# Patient Record
Sex: FEMALE | Race: WHITE | Employment: STUDENT | URBAN - METROPOLITAN AREA
[De-identification: names, ages, dates, MRNs, and addresses within clinical notes are randomized per-mention and may not be internally consistent; named-entity substitution may affect disease eponyms.]

---
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
## 2023-01-13 ENCOUNTER — PATIENT MESSAGE (OUTPATIENT)
Dept: FAMILY MEDICINE CLINIC | Facility: CLINIC | Age: 18
End: 2023-01-13

## 2023-01-16 RX ORDER — CYCLOBENZAPRINE HCL 5 MG
5 TABLET ORAL NIGHTLY PRN
Qty: 20 TABLET | Refills: 0 | Status: SHIPPED | OUTPATIENT
Start: 2023-01-16

## 2023-01-16 NOTE — TELEPHONE ENCOUNTER
From: Tristen Esparza  To: Steve Lu PA-C  Sent: 1/13/2023 8:09 PM CST  Subject: Need muscle relaxer for jaw pain    Hello,    I am still experiencing pain in my jaw. You mentioned muscle relaxers could help alleviate the pain. Is it possible to please prescribe.      Thanks,  Edis Palacio

## 2023-03-27 DIAGNOSIS — N94.6 DYSMENORRHEA: ICD-10-CM

## 2023-03-28 RX ORDER — LEVONORGESTREL AND ETHINYL ESTRADIOL 0.1-0.02MG
1 KIT ORAL DAILY
Qty: 3 EACH | Refills: 2 | Status: SHIPPED | OUTPATIENT
Start: 2023-03-28

## 2023-04-09 DIAGNOSIS — N94.6 DYSMENORRHEA: ICD-10-CM

## 2023-04-12 RX ORDER — LEVONORGESTREL AND ETHINYL ESTRADIOL 0.1-0.02MG
1 KIT ORAL DAILY
Qty: 3 EACH | Refills: 2 | Status: SHIPPED | OUTPATIENT
Start: 2023-04-12

## 2023-06-03 NOTE — LETTER
Date: 3/20/2019    Patient Name: Radha Britton DOB 2005        To Whom it may concern: The above patient was seen at the [facility] for treatment of a medical condition.     This patient should be excused from attending school late
Patient/Father

## 2023-06-29 DIAGNOSIS — N94.6 DYSMENORRHEA: ICD-10-CM

## 2023-06-29 RX ORDER — LEVONORGESTREL AND ETHINYL ESTRADIOL 0.1-0.02MG
1 KIT ORAL DAILY
Qty: 3 EACH | Refills: 2 | OUTPATIENT
Start: 2023-06-29

## 2023-06-30 ENCOUNTER — PATIENT MESSAGE (OUTPATIENT)
Dept: FAMILY MEDICINE CLINIC | Facility: CLINIC | Age: 18
End: 2023-06-30

## 2023-06-30 DIAGNOSIS — N94.6 DYSMENORRHEA: ICD-10-CM

## 2023-06-30 RX ORDER — LEVONORGESTREL AND ETHINYL ESTRADIOL 0.1-0.02MG
1 KIT ORAL DAILY
Qty: 3 EACH | Refills: 0 | Status: SHIPPED | OUTPATIENT
Start: 2023-06-30

## 2023-09-18 DIAGNOSIS — N94.6 DYSMENORRHEA: ICD-10-CM

## 2023-09-19 RX ORDER — LEVONORGESTREL AND ETHINYL ESTRADIOL 0.1-0.02MG
1 KIT ORAL DAILY
Qty: 1 EACH | Refills: 0 | Status: SHIPPED | OUTPATIENT
Start: 2023-09-19

## 2023-10-09 ENCOUNTER — OFFICE VISIT (OUTPATIENT)
Dept: FAMILY MEDICINE CLINIC | Facility: CLINIC | Age: 18
End: 2023-10-09
Payer: COMMERCIAL

## 2023-10-09 VITALS
SYSTOLIC BLOOD PRESSURE: 118 MMHG | TEMPERATURE: 98 F | BODY MASS INDEX: 30.05 KG/M2 | WEIGHT: 176 LBS | RESPIRATION RATE: 16 BRPM | OXYGEN SATURATION: 100 % | HEART RATE: 103 BPM | HEIGHT: 63.98 IN | DIASTOLIC BLOOD PRESSURE: 74 MMHG

## 2023-10-09 DIAGNOSIS — Z13.228 SCREENING FOR ENDOCRINE, NUTRITIONAL, METABOLIC AND IMMUNITY DISORDER: ICD-10-CM

## 2023-10-09 DIAGNOSIS — N94.6 DYSMENORRHEA: ICD-10-CM

## 2023-10-09 DIAGNOSIS — Z13.21 SCREENING FOR ENDOCRINE, NUTRITIONAL, METABOLIC AND IMMUNITY DISORDER: ICD-10-CM

## 2023-10-09 DIAGNOSIS — E66.9 OBESITY (BMI 30.0-34.9): ICD-10-CM

## 2023-10-09 DIAGNOSIS — Z13.29 SCREENING FOR ENDOCRINE, NUTRITIONAL, METABOLIC AND IMMUNITY DISORDER: ICD-10-CM

## 2023-10-09 DIAGNOSIS — Z11.3 ROUTINE SCREENING FOR STI (SEXUALLY TRANSMITTED INFECTION): ICD-10-CM

## 2023-10-09 DIAGNOSIS — Z13.0 SCREENING FOR ENDOCRINE, NUTRITIONAL, METABOLIC AND IMMUNITY DISORDER: ICD-10-CM

## 2023-10-09 DIAGNOSIS — Z79.899 MEDICATION MANAGEMENT: ICD-10-CM

## 2023-10-09 DIAGNOSIS — Z13.6 ENCOUNTER FOR LIPID SCREENING FOR CARDIOVASCULAR DISEASE: ICD-10-CM

## 2023-10-09 DIAGNOSIS — F41.0 EPISODIC PAROXYSMAL ANXIETY DISORDER: ICD-10-CM

## 2023-10-09 DIAGNOSIS — Z00.00 WELL ADULT EXAM: Primary | ICD-10-CM

## 2023-10-09 DIAGNOSIS — Z13.220 ENCOUNTER FOR LIPID SCREENING FOR CARDIOVASCULAR DISEASE: ICD-10-CM

## 2023-10-09 PROCEDURE — 99213 OFFICE O/P EST LOW 20 MIN: CPT | Performed by: FAMILY MEDICINE

## 2023-10-09 PROCEDURE — 3074F SYST BP LT 130 MM HG: CPT | Performed by: FAMILY MEDICINE

## 2023-10-09 PROCEDURE — 3078F DIAST BP <80 MM HG: CPT | Performed by: FAMILY MEDICINE

## 2023-10-09 PROCEDURE — 87491 CHLMYD TRACH DNA AMP PROBE: CPT | Performed by: FAMILY MEDICINE

## 2023-10-09 PROCEDURE — 87591 N.GONORRHOEAE DNA AMP PROB: CPT | Performed by: FAMILY MEDICINE

## 2023-10-09 PROCEDURE — 99395 PREV VISIT EST AGE 18-39: CPT | Performed by: FAMILY MEDICINE

## 2023-10-09 PROCEDURE — 3008F BODY MASS INDEX DOCD: CPT | Performed by: FAMILY MEDICINE

## 2023-10-09 RX ORDER — DESOGESTREL AND ETHINYL ESTRADIOL 0.15-0.03
1 KIT ORAL DAILY
Qty: 28 TABLET | Refills: 12 | Status: SHIPPED | OUTPATIENT
Start: 2023-10-09 | End: 2024-10-08

## 2023-10-09 RX ORDER — HYDROXYZINE HYDROCHLORIDE 10 MG/1
TABLET, FILM COATED ORAL 3 TIMES DAILY PRN
Qty: 30 TABLET | Refills: 0 | Status: SHIPPED | OUTPATIENT
Start: 2023-10-09

## 2023-10-10 LAB
C TRACH DNA SPEC QL NAA+PROBE: NEGATIVE
N GONORRHOEA DNA SPEC QL NAA+PROBE: NEGATIVE

## 2023-10-10 NOTE — PROGRESS NOTES
Note was reviewed and addended patient was seen by Nile Cash PA-C with  Ashok Mcmillan RN, APN student

## 2024-01-09 ENCOUNTER — PATIENT MESSAGE (OUTPATIENT)
Dept: FAMILY MEDICINE CLINIC | Facility: CLINIC | Age: 19
End: 2024-01-09

## 2024-01-10 ENCOUNTER — LAB REQUISITION (OUTPATIENT)
Dept: LAB | Facility: HOSPITAL | Age: 19
End: 2024-01-10
Payer: COMMERCIAL

## 2024-01-10 DIAGNOSIS — N94.6 DYSMENORRHEA: ICD-10-CM

## 2024-01-10 DIAGNOSIS — L30.9 DERMATITIS, UNSPECIFIED: ICD-10-CM

## 2024-01-10 PROCEDURE — 88305 TISSUE EXAM BY PATHOLOGIST: CPT

## 2024-01-10 RX ORDER — DESOGESTREL AND ETHINYL ESTRADIOL 0.15-0.03
1 KIT ORAL DAILY
Qty: 84 TABLET | Refills: 1 | Status: SHIPPED | OUTPATIENT
Start: 2024-01-10 | End: 2025-01-09

## 2024-01-10 NOTE — TELEPHONE ENCOUNTER
From: Michell Romero  To: Jennifer Crowell  Sent: 1/9/2024 1:35 PM CST  Subject: Birth Control Prescription Update    Hello!     I want to continue taking my current birth control prescription, however I was wondering if we can change the supply to three months instead of one for my upcoming refill.     Thanks!     Michell SOLIZ

## 2024-01-30 ENCOUNTER — TELEMEDICINE (OUTPATIENT)
Dept: FAMILY MEDICINE CLINIC | Facility: CLINIC | Age: 19
End: 2024-01-30
Payer: COMMERCIAL

## 2024-01-30 DIAGNOSIS — S93.422A SPRAIN OF DELTOID LIGAMENT OF LEFT ANKLE, INITIAL ENCOUNTER: Primary | ICD-10-CM

## 2024-01-30 DIAGNOSIS — M79.672 LEFT FOOT PAIN: ICD-10-CM

## 2024-01-30 PROCEDURE — 99213 OFFICE O/P EST LOW 20 MIN: CPT | Performed by: FAMILY MEDICINE

## 2024-01-30 RX ORDER — KETOCONAZOLE 20 MG/ML
SHAMPOO TOPICAL
COMMUNITY
Start: 2024-01-09

## 2024-01-30 RX ORDER — FLUOCINONIDE TOPICAL SOLUTION USP, 0.05% 0.5 MG/ML
SOLUTION TOPICAL
COMMUNITY
Start: 2024-01-09

## 2024-01-30 NOTE — PROGRESS NOTES
Michell Romero is a 18 year old female.  HPI:   Please note that the following visit was completed using two-way, real-time interactive audio and video communication.  This has been done in good denia to provide continuity of care in the best interest of the provider-patient relationship,  There are limitations of this visit as no physical exam could be performed.  Every conscious effort was taken to allow for sufficient and adequate time.  This billing was spent on reviewing labs, medications, radiology tests and decision making.  Appropriate medical decision-making and tests are ordered as detailed in the plan of care above.      Audiovideo call with patient to discuss ankle and foot pain.  Patient states it was missed no a day 2 weeks ago was walking her dog and slipped slightly and felt a strain to the inside of her left foot and ankle.  States that there was minimal swelling but no bruising.  No numbness, tingling or underlying weakness.  Did have a problem walking for the first couple of days when she first injured herself is now walking and working out though when she increases her activity gets more pain.  She is able to walk on her heels and toes with minimal discomfort.  Repetition of activity aggravates the inner aspect of the foot and ankle along the deltoid ligament.  She has not had any toe pain but does state that the foot pain will extend to the middle aspect of   medial part of the foot.  No prior injury and no reoccurrence of injury  Current Outpatient Medications   Medication Sig Dispense Refill    Fluocinonide 0.05 % External Solution Apply to affected area(s) on scalp every night at bedtime for 2 weeks when flared as needed      ketoconazole 2 % External Shampoo Massage into scalp 2 to 3 times weekly; let sit for 3 to 5 minutes, then rinse      Desogestrel-Ethinyl Estradiol (RECLIPSEN) 0.15-30 MG-MCG Oral Tab Take 1 tablet by mouth daily. 84 tablet 1    hydrOXYzine 10 MG Oral Tab Take 1-3  tablets (10-30 mg total) by mouth 3 (three) times daily as needed for Anxiety. 30 tablet 0    cyclobenzaprine 5 MG Oral Tab Take 1 tablet (5 mg total) by mouth nightly as needed for Muscle spasms (TMJ). 20 tablet 0    Multiple Vitamins-Minerals (MULTIVITAMIN GUMMIES WOMENS) Oral Chew Tab Chew 3 tablets by mouth daily.      aspirin-acetaminophen-caffeine 250-250-65 MG Oral Tab Take 1 tablet by mouth every 6 (six) hours as needed for Pain.      lactase 9000 units Oral Chew Tab Chew 1 tablet (9,000 Units total) by mouth 3 (three) times daily as needed.      ibuprofen (MOTRIN) 200 MG Oral Tab Take 2 tablets (400 mg total) by mouth every 6 (six) hours as needed for Pain (menstrual pain).      busPIRone 10 MG Oral Tab Take 1 tablet (10 mg total) by mouth every morning. (Patient not taking: Reported on 1/30/2024) 90 tablet 1      Past Medical History:   Diagnosis Date    Allergic rhinitis     Anxiety     Closed fracture of middle phalanx of ring finger 12/02/2014      Social History:  Social History     Socioeconomic History    Marital status: Single   Tobacco Use    Smoking status: Never    Smokeless tobacco: Never   Vaping Use    Vaping Use: Never used   Substance and Sexual Activity    Alcohol use: No    Drug use: No   Other Topics Concern     Service No    Blood Transfusions No    Caffeine Concern Yes     Comment: 2 coffees daily and  1 can of pop & 1 cup of tea weekly    Occupational Exposure No    Hobby Hazards No    Sleep Concern No    Stress Concern Yes    Weight Concern No    Special Diet No    Back Care No    Exercise Yes     Comment: 6 times weekly    Bike Helmet No    Seat Belt Yes    Self-Exams No        REVIEW OF SYSTEMS:   GENERAL HEALTH: feels well otherwise  SKIN: denies any unusual skin lesions or rashes  RESPIRATORY: denies shortness of breath with exertion  CARDIOVASCULAR: denies chest pain on exertion  GI: denies abdominal pain and denies heartburn  NEURO: denies headaches  Musculoskeletal:   see above  EXAM:   No vitals taken due to tele-visit.  20 minutes time was spent reviewing patient's inquiry, patient's record including prior labs, developing management plan and ordering tests and prescriptions.    Full exam was not done secondary to virtual visit.  Reviewed medications, problem list and allergies.  GENERAL: Patient is speaking in full sentences no increased work in breathing patient is alert and orientated x3.    Psych patient's mood is normal and communication skills are good  Patient was able to show the area of where her foot is bothering her which seems to be along the deltoid ligament of the left navicular bone with tenderness over that bone.  There is no swelling or bruising present.  ASSESSMENT AND PLAN:     Encounter Diagnoses   Name Primary?    Sprain of deltoid ligament of left ankle, initial encounter Yes    Left foot pain        No orders of the defined types were placed in this encounter.      Meds & Refills for this Visit:  Requested Prescriptions      No prescriptions requested or ordered in this encounter       Imaging & Consults:  XR ANKLE (MIN 3 VIEWS), LEFT (CPT=73610)  XR FOOT, COMPLETE (MIN 3 VIEWS), LEFT (CPT=73630)  1. Sprain of deltoid ligament of left ankle, initial encounter   Left foot pain  - XR Ankle left (Min 3 views) - [facility] Consult Only; Future  - XR FOOT, COMPLETE (MIN 3 VIEWS), LEFT (CPT=73630); Future  Patient is away at school but coming in on Friday will get x-rays done at that time discussed the possibility and avulsion fracture but most likely this is just a grade 2 ligament strain and will require home therapy.  Reviewed exercises that she can do and exercises for proprioception once the x-ray confirms no fracture.  She can do topical CBD, Voltaren, to the ankle rubbing it in to help with the healing of the sprain.  Discussed possibilities of reoccurring sprains with the area being vulnerable to weakness.  Important for her to wrap if she is doing  anything extensive over the next few weeks and to continue with the ankle exercises as instructed can go on YouTube for further ankle exercises.      The patient indicates understanding of these issues and agrees to the plan.  The patient is asked to return  as needed.    This note was created by [employer] voice recognition. Errors in content may be related to improper recognition by the system; efforts to review and correct have been done but errors may still exist.  Note to patient: The 21st Century Cures Act makes medical notes like these available to patients in the interest of transparency. However, be advised this is a medical document. It is intended as peer to peer communication. It is written in medical language and may contain abbreviations or verbiage that are unfamiliar. It may appear blunt or direct. Medical documents are intended to carry relevant information, facts as evident, and the clinical opinion of the practitioner.

## 2024-02-02 ENCOUNTER — HOSPITAL ENCOUNTER (OUTPATIENT)
Dept: GENERAL RADIOLOGY | Age: 19
Discharge: HOME | End: 2024-02-02
Attending: FAMILY MEDICINE
Payer: COMMERCIAL

## 2024-02-02 DIAGNOSIS — M79.672 LEFT FOOT PAIN: ICD-10-CM

## 2024-02-02 DIAGNOSIS — S93.422A SPRAIN OF DELTOID LIGAMENT OF LEFT ANKLE, INITIAL ENCOUNTER: ICD-10-CM

## 2024-02-02 DIAGNOSIS — N94.6 DYSMENORRHEA: ICD-10-CM

## 2024-02-02 PROCEDURE — 73630 X-RAY EXAM OF FOOT: CPT | Performed by: FAMILY MEDICINE

## 2024-02-02 PROCEDURE — 73610 X-RAY EXAM OF ANKLE: CPT | Performed by: FAMILY MEDICINE

## 2024-02-05 RX ORDER — DESOGESTREL AND ETHINYL ESTRADIOL 0.15-0.03
1 KIT ORAL DAILY
Qty: 84 TABLET | Refills: 1 | OUTPATIENT
Start: 2024-02-05 | End: 2025-02-04

## 2024-05-05 ENCOUNTER — PATIENT MESSAGE (OUTPATIENT)
Dept: FAMILY MEDICINE CLINIC | Facility: CLINIC | Age: 19
End: 2024-05-05

## 2024-05-05 DIAGNOSIS — S99.912D INJURY OF LEFT ANKLE, SUBSEQUENT ENCOUNTER: Primary | ICD-10-CM

## 2024-05-06 NOTE — TELEPHONE ENCOUNTER
Patient is following up regarding ankle injury from January. Still having pain. OK to refer to podiatry?

## 2024-05-06 NOTE — TELEPHONE ENCOUNTER
From: Michell Romero  To: Jennifer Crowell  Sent: 5/5/2024 10:22 PM CDT  Subject: Sprain of deltoid ligament still hurts     Hello,     I am just following up regarding my ligament injury that I got in mid-January. Since x-rays in february showing no fractures, I’ve been wearing an ankle brace, doing stretches and resting it as much as I can but I am still in pain the more I use/walk on my foot. I just wanted to see if there is anything else I should/can be doing and ask if a sprain of this nature is just something that takes this long to heal.     Any guidance would be appreciated.    Thanks,  Michell Romero

## 2024-05-24 ENCOUNTER — APPOINTMENT (OUTPATIENT)
Dept: GENERAL RADIOLOGY | Age: 19
End: 2024-05-24
Attending: PHYSICIAN ASSISTANT

## 2024-05-24 ENCOUNTER — HOSPITAL ENCOUNTER (OUTPATIENT)
Age: 19
Discharge: HOME | End: 2024-05-24

## 2024-05-24 ENCOUNTER — TELEPHONE (OUTPATIENT)
Dept: ORTHOPEDICS CLINIC | Facility: CLINIC | Age: 19
End: 2024-05-24

## 2024-05-24 VITALS
TEMPERATURE: 98 F | HEART RATE: 95 BPM | OXYGEN SATURATION: 99 % | WEIGHT: 170 LBS | BODY MASS INDEX: 29.02 KG/M2 | DIASTOLIC BLOOD PRESSURE: 74 MMHG | HEIGHT: 64 IN | SYSTOLIC BLOOD PRESSURE: 141 MMHG | RESPIRATION RATE: 16 BRPM

## 2024-05-24 DIAGNOSIS — S90.32XA CONTUSION OF LEFT FOOT, INITIAL ENCOUNTER: Primary | ICD-10-CM

## 2024-05-24 PROCEDURE — 99204 OFFICE O/P NEW MOD 45 MIN: CPT

## 2024-05-24 PROCEDURE — 73630 X-RAY EXAM OF FOOT: CPT | Performed by: PHYSICIAN ASSISTANT

## 2024-05-24 PROCEDURE — 99213 OFFICE O/P EST LOW 20 MIN: CPT

## 2024-05-24 RX ORDER — IBUPROFEN 600 MG/1
600 TABLET ORAL EVERY 8 HOURS PRN
Qty: 30 TABLET | Refills: 0 | Status: SHIPPED | OUTPATIENT
Start: 2024-05-24 | End: 2024-06-03

## 2024-05-24 RX ORDER — HYDROCODONE BITARTRATE AND ACETAMINOPHEN 5; 325 MG/1; MG/1
1 TABLET ORAL EVERY 12 HOURS PRN
Qty: 6 TABLET | Refills: 0 | Status: SHIPPED | OUTPATIENT
Start: 2024-05-24

## 2024-05-24 NOTE — TELEPHONE ENCOUNTER
October 13, 2022       Luz Marina Aviles DO  [address] [facility] [address]  Via In Basket      Patient: Nuha Greenberg   YOB: 1980   Date of Visit: 10/13/2022       Dear Dr. Aviles:    Thank you for referring Nuha Greenberg to me for evaluation. Below are my notes for this visit with her.    If you have questions, please do not hesitate to call me. I look forward to following your patient along with you.      Sincerely,        Dioni Zepeda MD        CC: MD Dioni Mina MD  10/13/2022 10:14 AM  Signed      Subjective   Patient ID: Nuha is a 42 year old female.  Referring Physician: Luz Marina Aviles DO    Chief Complaint   Patient presents with   • Consultation     Possible IBS - loose stools. needs colon prior to hysterectomy (scheduled for 11/29).     HPI    42 year old female with PMH significant for asthma, migraines, hx of cholecystectomy who presents with diarrhea.     I personally reviewed the record from patient's visit with Dr. Pedersen 9/11/22 which is summarized as follows:  She has been having diarrhea intermittently for the prior 10 years.  She is also having urgency with bowel movements.  Certain foods can make her urgency worse including burger/fries, greasy foods.  Bowel movements vary from loose to formed.  She does eat oatmeal several times a week.  She has tried Pepto-Bismol with some improvement.  She has also tried Tums with improvement as well.  She does take Zoloft and has been on this long-term.  She was told to keep a food journal and try low FODMAP diet.    I personally reviewed the most recent labs which are summarized as follows:  6/21/22 -normal CMP, normal CBC, normal TSH    Appt 10/13/22:  She had a cholecystectomy in 2012 and her diarrhea started after that.   She has a hysterectomy scheduled for 11/29/22 for menorrhagia/increased endometrial thickening.   She has lost 15-20 pounds recently due to having significant bowel frequency and not eating.  Patient is scheduled for left foot pain. Please advise if imaging is needed.     Future Appointments   Date Time Provider Department Center   6/4/2024  9:45 AM Julianna Vidales DPM [facility]   6/17/2024  9:30 AM Mathieu Brand DPM [id number]          Previously she was having up to 2 bowel movements per day which were \"mushy\" without blood or mucus but there was significant urgency.   She is now having a bowel movement every couple days but she hasn't been eating much.  She doesn't chew gum or eat mints on a regular basis.      Past Medical History:   Diagnosis Date   • Abdominal pain     LLQ   • Abnormal uterine bleeding    • Asthma    • Galactorrhea    • Hypothyroid    • Menorrhagia    • Migraines      Past Surgical History:   Procedure Laterality Date   • Cholecystectomy     • Hysteroscopy      Uterine polyp   • Tonsillectomy and adenoidectomy       Current Outpatient Medications   Medication Sig Dispense Refill   • cholestyramine (QUESTRAN) 4 g packet Take 1 packet by mouth in the morning and 1 packet in the evening. Take with meals. Space out by at least 2 hours from other medications 30 packet 5   • levothyroxine 150 MCG tablet Take 1 tablet by mouth daily. 90 tablet 3   • sertraline (ZOLOFT) 100 MG tablet TAKE 1 TABLET DAILY 90 tablet 0   • sumatriptan (IMITREX) 100 MG tablet Take 1 tablet by mouth at onset of migraine. May repeat after 2 hours if needed. 27 tablet 3   • budesonide-formoterol (SYMBICORT) 160-4.5 MCG/ACT inhaler Inhale 2 puffs into the lungs 2 times daily. (Patient taking differently: Inhale 2 puffs into the lungs 2 times daily as needed.) 10.2 g 6   • albuterol 108 (90 Base) MCG/ACT inhaler Inhale 2 puffs into the lungs every 4 hours as needed for Shortness of Breath or Wheezing.       No current facility-administered medications for this visit.       ALLERGIES:  No Known Allergies    Family History   Problem Relation Age of Onset   • Hyperlipidemia Mother    • Myocardial Infarction Father    • Coronary Artery Disease Father    • Cancer, Colon Neg Hx    • Cancer, Esophageal Neg Hx    • Cancer, Liver Neg Hx    • Cancer, Rectal Neg Hx    • Cancer, Stomach Neg Hx        Social History     Tobacco Use   • Smoking status: Never Smoker   •  Smokeless tobacco: Never Used   Vaping Use   • Vaping Use: never used   Substance Use Topics   • Alcohol use: Yes     Comment: occ   • Drug use: Never           ROS  Except as noted elsewhere in the note, the ROS is negative for Constitutional, HEENT, CV, Respiratory, GI, , Musculoskeletal, Neuro, Psychiatric, Heme/Lymph note, Allergy,Endocrine, and Skin.       Objective   /74   Pulse 72   Ht 5' 1\" (1.549 m)   Wt 84.8 kg (187 lb)   LMP 08/28/2022 (Exact Date)   BMI 35.33 kg/m²   BSA 1.84 m²   The patient is alert and oriented, in no acute distress.  HEENT: Sclera are nonicteric.  CARDIAC: RRR, normal S1, S2. No murmurs, rubs or gallops.  PULMONARY: Clear bilaterally to ascultation  ABDOMEN: Soft, non-tender, non-distended. Bowel sounds arepresent. No obvious hepatomegaly, splenomegaly, or masses. No hernias or ascites.  PERIANAL: Examination deferred.  GENITALS: Examination deferred.  LYMPATICS: No palpable nodes in the neck  MUSCULOSKELETAL: Normal station, digits and nails. Extremities are all normal without edema.  SKIN: Anicteric.      Assessment   Problem List Items Addressed This Visit        Gastrointestinal and Abdominal    Bile salt-induced diarrhea - Primary        She most likely has bile acid induced diarrhea since her symptoms started after her cholecystectomy about 10 years ago.  She does not have a lot of bowel frequency but she does have significant urgency after eating fatty foods.  I am recommending cholestyramine 4 g daily before dinner and if this does not work she can increase the dose to 4 g twice daily before breakfast and dinner.  She was told to space this medicine out at least 2 hours from any other medication she is taking.  Microscopic colitis is also on the differential given that she takes an SSRI but the likelihood of this is low.  She will follow-up with me in 2 months and if she is still having diarrhea despite using cholestyramine I will plan to do an upper endoscopy  and colonoscopy.  She will need a screening colonoscopy starting at age 43 so this can be planned sometime next year after May 2023.    Dioni Zepeda MD  Gastroenterology  Specialist in Inflammatory Bowel Disease (Crohn's and Colitis)

## 2024-05-24 NOTE — ED PROVIDER NOTES
Patient Seen in: [facility]      History     Chief Complaint   Patient presents with    Foot Injury     Stated Complaint: foot injury    Subjective:   HPI  Michell Romero is 19 year old female presents with acute left foot pain x 1 day due to inversion injury.  Patient reports pain localized to dorsum.  Non radiating, worsened w/ ambulation, weight bearing, and palpation.  No numbness, tingling, parasthesias, skin/ color/ temperature/ sensory changes reported.  No medications taken prior to arrival.           Objective:   Past Medical History:    Allergic rhinitis    Anxiety    Closed fracture of middle phalanx of ring finger              History reviewed. No pertinent surgical history.             No pertinent social history.            Review of Systems   All other systems reviewed and are negative.      Positive for stated complaint: foot injury  Other systems are as noted in HPI.  Constitutional and vital signs reviewed.      All other systems reviewed and negative except as noted above.    Physical Exam     ED Triage Vitals [05/24/24 1014]   /74   Pulse 95   Resp 16   Temp 97.8 °F (36.6 °C)   Temp src Temporal   SpO2 99 %   O2 Device        Current Vitals:   Vital Signs  BP: 141/74  Pulse: 95  Resp: 16  Temp: 97.8 °F (36.6 °C)  Temp src: Temporal    Oxygen Therapy  SpO2: 99 %            Physical Exam  Vitals and nursing note reviewed.   Constitutional:       General: She is not in acute distress.     Appearance: Normal appearance. She is normal weight. She is not ill-appearing, toxic-appearing or diaphoretic.   HENT:      Head: Normocephalic and atraumatic.      Right Ear: External ear normal.      Left Ear: External ear normal.      Nose: Nose normal.   Eyes:      Extraocular Movements: Extraocular movements intact.      Conjunctiva/sclera: Conjunctivae normal.      Pupils: Pupils are equal, round, and reactive to light.   Pulmonary:      Effort: Pulmonary effort is normal. No  respiratory distress.   Musculoskeletal:         General: Tenderness and signs of injury present. No swelling or deformity. Normal range of motion.      Cervical back: Normal range of motion and neck supple.      Comments: Superficial abrasion over dorsum of left foot otherwise capillary refill less than 2 seconds, DP/ PT pulses intact 2+ bilaterally      Skin:     General: Skin is warm and dry.      Capillary Refill: Capillary refill takes less than 2 seconds.      Coloration: Skin is not jaundiced or pale.      Findings: No bruising, erythema, lesion or rash.   Neurological:      General: No focal deficit present.      Mental Status: She is alert and oriented to person, place, and time. Mental status is at baseline.      Gait: Gait normal.   Psychiatric:         Mood and Affect: Mood normal.         Behavior: Behavior normal.               ED Course   Labs Reviewed - No data to display  XR FOOT, COMPLETE (MIN 3 VIEWS), LEFT (CPT=73630)   Final Result   PROCEDURE:  XR FOOT, COMPLETE (MIN 3 VIEWS), LEFT (CPT=73630)       TECHNIQUE:  AP, oblique, and lateral views were obtained.       COMPARISON:  IRIS CORDOVA, XR FOOT, COMPLETE (MIN 3 VIEWS), LEFT    (CPT=73630), 2/02/2024, 2:49 PM.       INDICATIONS:  Status post injury to the dorsum of the left foot, pain.       PATIENT STATED HISTORY: (As transcribed by Technologist)  Left foot,    dorsal surface pain after falling.            FINDINGS:     BONES:  Normal.  No significant arthropathy or acute abnormality.   SOFT TISSUES:  Negative.  No visible soft tissue swelling.   EFFUSION:  None visible.   OTHER:  Negative.                         =====   CONCLUSION:  Negative exam.           LOCATION:  [facility]           Dictated by (CST): Cecilia Montoya DO on 5/24/2024 at 10:57 AM        Finalized by (CST): Cecilia Montoya DO on 5/24/2024 at 10:58 AM                            [facility]                                        Medical Decision Making  19 year old female presents with  contusion to dorsum of left foot sustained less than 24 hours prior.  Consider fracture vs dislocation vs contusion   Plan  - x ray: left foot  -  Crutches  - OTC: Tylenol 650mg po q 6 hours/ prn.   - rx: ibuprofen 600mg po q8 hours/ prn. norco 5/325mg po prn breakthrough pain sparingly, if needed   - RICE Apply a compressive ACE bandage. Rest and elevate the affected painful area. Apply cold compresses intermittently as needed. As pain recedes, begin normal activities slowly as tolerated. Call if symptoms persist.  - explained to patient that if symptoms do not improve that an MRI may be warranted however that will be determined at the discretion of follow up care  - refer to orthopaedics/ PCP  as needed     Amount and/or Complexity of Data Reviewed  Radiology: ordered and independent interpretation performed. Decision-making details documented in ED Course.     Details: No fracture or dislocation based on my independent interpretation         Disposition and Plan     Clinical Impression:  1. Contusion of left foot, initial encounter         Disposition:  Discharge  5/24/2024 11:08 am    Follow-up:  Julianna Vidales, DPM  [address] [place] [address]  [phone]                Medications Prescribed:  Current Discharge Medication List        START taking these medications    Details   !! ibuprofen 600 MG Oral Tab Take 1 tablet (600 mg total) by mouth every 8 (eight) hours as needed.  Qty: 30 tablet, Refills: 0      HYDROcodone-acetaminophen 5-325 MG Oral Tab Take 1 tablet by mouth every 12 (twelve) hours as needed for Pain.  Qty: 6 tablet, Refills: 0    Associated Diagnoses: Contusion of left foot, initial encounter       !! - Potential duplicate medications found. Please discuss with provider.

## 2024-06-04 ENCOUNTER — OFFICE VISIT (OUTPATIENT)
Dept: ORTHOPEDICS CLINIC | Facility: CLINIC | Age: 19
End: 2024-06-04
Payer: COMMERCIAL

## 2024-06-04 ENCOUNTER — HOSPITAL ENCOUNTER (OUTPATIENT)
Dept: GENERAL RADIOLOGY | Age: 19
Discharge: HOME | End: 2024-06-04
Attending: PODIATRIST
Payer: COMMERCIAL

## 2024-06-04 VITALS — BODY MASS INDEX: 29.02 KG/M2 | HEIGHT: 64 IN | WEIGHT: 170 LBS

## 2024-06-04 DIAGNOSIS — M79.672 PAIN ASSOCIATED WITH ACCESSORY NAVICULAR BONE OF LEFT FOOT: ICD-10-CM

## 2024-06-04 DIAGNOSIS — G89.29 CHRONIC PAIN OF LEFT ANKLE: ICD-10-CM

## 2024-06-04 DIAGNOSIS — Q74.2 PAIN ASSOCIATED WITH ACCESSORY NAVICULAR BONE OF LEFT FOOT: ICD-10-CM

## 2024-06-04 DIAGNOSIS — M77.9 TENDONITIS: Primary | ICD-10-CM

## 2024-06-04 DIAGNOSIS — M79.672 LEFT FOOT PAIN: ICD-10-CM

## 2024-06-04 DIAGNOSIS — M25.572 CHRONIC PAIN OF LEFT ANKLE: ICD-10-CM

## 2024-06-04 PROCEDURE — 99204 OFFICE O/P NEW MOD 45 MIN: CPT | Performed by: PODIATRIST

## 2024-06-04 PROCEDURE — 73630 X-RAY EXAM OF FOOT: CPT | Performed by: PODIATRIST

## 2024-06-04 PROCEDURE — 3008F BODY MASS INDEX DOCD: CPT | Performed by: PODIATRIST

## 2024-06-04 RX ORDER — MELOXICAM 15 MG/1
15 TABLET ORAL DAILY
Qty: 15 TABLET | Refills: 1 | Status: SHIPPED | OUTPATIENT
Start: 2024-06-04

## 2024-06-04 RX ORDER — CLOBETASOL PROPIONATE 0.46 MG/ML
SOLUTION TOPICAL
COMMUNITY
Start: 2024-03-28

## 2024-06-04 NOTE — PROGRESS NOTES
[facility] New Patient Note    CC:   Chief Complaint   Patient presents with    Leg or Foot Injury     Left foot injury;   DOI: 05/24/24;  *fell down stairs and foot went under her as she landed on it  Previous injury 01/15/24  *tripped and foot went inward;  Pain Score: 5       HPI: The patient is a 19 year old female who presents today with complaints of pain and swelling on the inside of her left foot.    January she had an injury  Sprained deltoid ligament ankle    2 weeks ago, tripped ontop of foot going down stairs  Was in flip flops   Saw  and they suggested she follow-up in case the 2 events were related and for further care      Past Medical History:    Allergic rhinitis    Anxiety    Closed fracture of middle phalanx of ring finger     History reviewed. No pertinent surgical history.  Current Outpatient Medications   Medication Sig Dispense Refill    clobetasol 0.05 % External Solution       Meloxicam 15 MG Oral Tab Take 1 tablet (15 mg total) by mouth daily. 15 tablet 1    HYDROcodone-acetaminophen 5-325 MG Oral Tab Take 1 tablet by mouth every 12 (twelve) hours as needed for Pain. 6 tablet 0    Fluocinonide 0.05 % External Solution Apply to affected area(s) on scalp every night at bedtime for 2 weeks when flared as needed      ketoconazole 2 % External Shampoo Massage into scalp 2 to 3 times weekly; let sit for 3 to 5 minutes, then rinse      Desogestrel-Ethinyl Estradiol (RECLIPSEN) 0.15-30 MG-MCG Oral Tab Take 1 tablet by mouth daily. 84 tablet 1    hydrOXYzine 10 MG Oral Tab Take 1-3 tablets (10-30 mg total) by mouth 3 (three) times daily as needed for Anxiety. 30 tablet 0    Multiple Vitamins-Minerals (MULTIVITAMIN GUMMIES WOMENS) Oral Chew Tab Chew 3 tablets by mouth daily.      aspirin-acetaminophen-caffeine 250-250-65 MG Oral Tab Take 1 tablet by mouth every 6 (six) hours as needed for Pain.      lactase 9000 units Oral Chew Tab Chew 1 tablet (9,000 Units total) by mouth 3 (three)  times daily as needed.      cyclobenzaprine 5 MG Oral Tab Take 1 tablet (5 mg total) by mouth nightly as needed for Muscle spasms (TMJ). (Patient not taking: Reported on 5/24/2024) 20 tablet 0    busPIRone 10 MG Oral Tab Take 1 tablet (10 mg total) by mouth every morning. (Patient not taking: Reported on 1/30/2024) 90 tablet 1    ibuprofen (MOTRIN) 200 MG Oral Tab Take 2 tablets (400 mg total) by mouth every 6 (six) hours as needed for Pain (menstrual pain). (Patient not taking: Reported on 6/4/2024)       Allergies   Allergen Reactions    Pcn [Penicillins] RASH    Doxycycline NAUSEA ONLY     Family History   Problem Relation Age of Onset    Psoriasis Mother     Hypertension Father     Other (Primary biliary cirrhosis) Maternal Grandmother     Dementia Paternal Grandmother     Heart Disease Paternal Grandfather     Cancer Neg     Stroke Neg      Social History     Occupational History    Not on file   Tobacco Use    Smoking status: Never    Smokeless tobacco: Never   Vaping Use    Vaping status: Never Used   Substance and Sexual Activity    Alcohol use: No    Drug use: No    Sexual activity: Not on file        ROS:  Complete ROS reviewed by me and non-contributory to the chief complaint except as mentioned above.    Physical Exam:    Ht 5' 4\" (1.626 m)   Wt 170 lb (77.1 kg)   LMP 05/03/2024 (Approximate)   BMI 29.18 kg/m²       Exam left foot and ankle.  Patient has a flexible flatfoot and overpronation in walking gait.  Left foot tenderness at the navicular tuberosity without dorsal navicular pain.  Pain at the talonavicular joint.  She has full strength of the posterior tibial tendon and she is able to go up onto her toes slowly but has a little discomfort but no weakness.  Tenderness along the course of the posterior tibial tendon but more so insertional tendinitis pain.  Sensation is intact sharp versus dull.  She can feel light touch to the tips of the toes  Palpable pedal pulses.  Hair growth is present.   Skin is supple and feet are well perfused and warm.    Strength is 5 out of 5 all muscle groups    Full range of motion and nonpainful motion of the ankle joint, subtalar joint, MP joints, and midfoot joints.     Imaging: X-ray of the foot today 3 views shows accessory navicular ossicle best seen on the lateral view.  Review of past x-rays ankle and foot show the ankle mortise to be clear and show again the accessory ossicle off the navicular.  Personally viewed, independently interpreted and radiology report read.      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        Plan:  I reviewed imaging and exam findings with the patient.  We discussed the findings on x-ray and the insertional tendinitis.  How this is related to the accessory ossicle and also with a flatfoot issue.  Likely she was having some instability from her injury and January and was aware of the foot not 100%, she took a fall again and now is reaggravated the PT tendon.    Recommending physical therapy for rehab and strengthening.  LE eval and treat  Modalities, graston and dry needling if available  Gait eval     RICE  Rx meloxicam  Take with food in AM  Dc if any side effects discussed    Shoes and types of shoes for her foot.  OTC inserts for arch support and consider custom at some point as well.    If pain persists or worsens, let us know  Next step is MRI of ankle to highlight PT tendon and navicular tuberosity    Julianna Vidales, DPMPodiatric Surgery  [facility]  [address], [address], [place] [address]   [address] [place] [address]   [address] [place] [address]  [email or web address]   [email or web address]  t: [phone]   f: [phone]              This document was partially prepared using Dragon Medical voice recognition software.

## 2024-06-22 DIAGNOSIS — N94.6 DYSMENORRHEA: ICD-10-CM

## 2024-06-26 RX ORDER — KETOCONAZOLE 20 MG/ML
SHAMPOO TOPICAL
Refills: 0 | OUTPATIENT
Start: 2024-06-26

## 2024-06-26 RX ORDER — DESOGESTREL AND ETHINYL ESTRADIOL 0.15-0.03
1 KIT ORAL DAILY
Qty: 84 TABLET | Refills: 0 | Status: SHIPPED | OUTPATIENT
Start: 2024-06-26

## 2024-06-26 RX ORDER — DESOGESTREL AND ETHINYL ESTRADIOL 0.15-0.03
1 KIT ORAL DAILY
Qty: 84 TABLET | Refills: 1 | OUTPATIENT
Start: 2024-06-26 | End: 2025-06-26

## 2024-07-02 ENCOUNTER — APPOINTMENT (OUTPATIENT)
Dept: GENERAL RADIOLOGY | Age: 19
End: 2024-07-02
Payer: COMMERCIAL

## 2024-07-02 ENCOUNTER — HOSPITAL ENCOUNTER (EMERGENCY)
Age: 19
Discharge: HOME | End: 2024-07-02
Payer: COMMERCIAL

## 2024-07-02 VITALS
SYSTOLIC BLOOD PRESSURE: 143 MMHG | WEIGHT: 170 LBS | OXYGEN SATURATION: 99 % | RESPIRATION RATE: 20 BRPM | DIASTOLIC BLOOD PRESSURE: 84 MMHG | TEMPERATURE: 98 F | BODY MASS INDEX: 29 KG/M2 | HEART RATE: 99 BPM

## 2024-07-02 DIAGNOSIS — S63.657A SPRAIN OF METACARPOPHALANGEAL (MCP) JOINT OF LEFT LITTLE FINGER, INITIAL ENCOUNTER: Primary | ICD-10-CM

## 2024-07-02 PROCEDURE — 29130 APPL FINGER SPLINT STATIC: CPT

## 2024-07-02 PROCEDURE — 73140 X-RAY EXAM OF FINGER(S): CPT

## 2024-07-02 PROCEDURE — 99283 EMERGENCY DEPT VISIT LOW MDM: CPT

## 2024-07-03 NOTE — ED INITIAL ASSESSMENT (HPI)
Patient arrives from with c/o injury to left pinky finger injury. States jammed while adjusting back pack

## 2024-07-03 NOTE — ED PROVIDER NOTES
Patient Seen in: [facility] [place]      History     Chief Complaint   Patient presents with    Arm or Hand Injury     Stated Complaint: finger injury    Subjective:   HPI    19-year-old female.  Patient was transferring her backpack from her right hand to her left hand.  Unfortunately, the hand loop caught her left pinky finger and vividly yanked it laterally.  She had pain to the MCP joint.  She does maintain full range of motion.    Objective:   Past Medical History:    Allergic rhinitis    Anxiety    Closed fracture of middle phalanx of ring finger              History reviewed. No pertinent surgical history.             Social History     Socioeconomic History    Marital status: Single   Tobacco Use    Smoking status: Never    Smokeless tobacco: Never   Vaping Use    Vaping status: Never Used   Substance and Sexual Activity    Alcohol use: No    Drug use: No   Other Topics Concern     Service No    Blood Transfusions No    Caffeine Concern Yes     Comment: 2 coffees daily and  1 can of pop & 1 cup of tea weekly    Occupational Exposure No    Hobby Hazards No    Sleep Concern No    Stress Concern Yes    Weight Concern No    Special Diet No    Back Care No    Exercise Yes     Comment: 6 times weekly    Bike Helmet No    Seat Belt Yes    Self-Exams No              Review of Systems    Positive for stated Chief Complaint: Arm or Hand Injury    Other systems are as noted in HPI.  Constitutional and vital signs reviewed.      All other systems reviewed and negative except as noted above.    Physical Exam     ED Triage Vitals [07/02/24 2216]   /84   Pulse 99   Resp 20   Temp 98 °F (36.7 °C)   Temp src Temporal   SpO2 99 %   O2 Device None (Room air)       Current Vitals:   Vital Signs  BP: 143/84  Pulse: 99  Resp: 20  Temp: 98 °F (36.7 °C)  Temp src: Temporal    Oxygen Therapy  SpO2: 99 %  O2 Device: None (Room air)            Physical Exam    Gen: Well appearing, well groomed,  alert and aware x 3  Neck: Supple, full range of motion  Eye examination: EOMs are intact, normal conjunctival  ENT: Atraumatic  Lung: No distress, RR, no retraction  Extremities:   No obvious swelling or deformity.  Maintains full flexion and extension to the finger.  Low-grade pain to palpation to the MCP joint    ED Course   Labs Reviewed - No data to display          XR FINGER(S) (MIN 2 VIEWS), LEFT 5TH (CPT=73140)    Result Date: 7/2/2024  PROCEDURE:  XR FINGER(S) (MIN 2 VIEWS), LEFT 5TH (CPT=73140)  INDICATIONS:  finger injury  COMPARISON:  None.  TECHNIQUE:  Three views of the finger were obtained.  PATIENT STATED HISTORY: (As transcribed by Technologist)  Patient got her pinky finger stuck in her backpack today. Patient stated at first she could not move her finger and now she has slight range of motion with pain.    FINDINGS:  There is no fracture, dislocation, or subluxation.  There is no lytic or blastic lesions.  The soft tissues are unremarkable.  The alignment of the bones is within normal limits.             CONCLUSION:  No acute disease.    LOCATION:  [facility]   Dictated by (CST): Rajesh Alejandre MD on 7/02/2024 at 10:41 PM     Finalized by (CST): Rajesh Alejandre MD on 7/02/2024 at 10:42 PM                MDM          No obvious swelling or deformity.  Maintains full flexion and extension to the finger.  Low-grade pain to palpation to the MCP joint    CONCLUSION:  No acute disease.    LOCATION:  [facility]   Dictated by (CST): Rajesh Alejandre MD on 7/02/2024 at 10:41 PM     Finalized by (CST): Rajesh Alejandre MD on 7/02/2024 at 10:42 PM       Long finger splint applied.  Remove and range of motion exercises at night.  Ice and Profen.                           Medical Decision Making      Disposition and Plan     Clinical Impression:  1. Sprain of metacarpophalangeal (MCP) joint of left little finger, initial encounter         Disposition:  There is no disposition on file for this visit.  There is no disposition  time on file for this visit.    Follow-up:  Natalie Hughes DO  [address]  [facility] [address] [phone]    Follow up            Medications Prescribed:  Current Discharge Medication List

## 2024-07-08 ENCOUNTER — PATIENT OUTREACH (OUTPATIENT)
Dept: CASE MANAGEMENT | Age: 19
End: 2024-07-08

## 2024-07-10 NOTE — PROGRESS NOTES
Pt had recent ED visit, calling to offer PCP follow-up apt (discharged 07/02)     Dr Natalie Hughes  Family Medicine, IP Consult to Primary Care  [address]  [facility] [address] [phone]  Multiple attempts w/no calls back; no apt made  Closing encounter  
Pt had recent ED visit, calling to offer PCP follow-up apt (discharged 07/02)    Dr Natalie Hughes  [facility] Medicine, IP Consult to Primary Care  [address]  [facility] [address] [phone]  LVM to call [phone] to assist w/scheduling apt      
stated

## 2024-08-16 ENCOUNTER — NURSE ONLY (OUTPATIENT)
Dept: FAMILY MEDICINE CLINIC | Facility: CLINIC | Age: 19
End: 2024-08-16
Payer: COMMERCIAL

## 2024-08-16 PROCEDURE — 90471 IMMUNIZATION ADMIN: CPT | Performed by: FAMILY MEDICINE

## 2024-08-16 PROCEDURE — 90715 TDAP VACCINE 7 YRS/> IM: CPT | Performed by: FAMILY MEDICINE

## 2024-09-16 ENCOUNTER — PATIENT MESSAGE (OUTPATIENT)
Dept: FAMILY MEDICINE CLINIC | Facility: CLINIC | Age: 19
End: 2024-09-16

## 2024-09-16 DIAGNOSIS — N94.6 DYSMENORRHEA: ICD-10-CM

## 2024-09-16 RX ORDER — DESOGESTREL AND ETHINYL ESTRADIOL 0.15-0.03
1 KIT ORAL DAILY
Qty: 84 TABLET | Refills: 0 | Status: SHIPPED | OUTPATIENT
Start: 2024-09-16

## 2024-09-16 NOTE — TELEPHONE ENCOUNTER
From: Michell Romero  To: Jennifer Crowell  Sent: 9/16/2024 10:03 AM CDT  Subject: Enskyce (birth control) refill    Hello,    I recently requested a refill of my Enskyce birth control prescription (84 day supply) through my [facility] pharmacy in [place]. I noticed that in my chart it still lists this medication to go to the [facility] pharmacy in [place]. I just want to ensure that I will get this prescription and that it will go to the [facility] pharmacy on [address] [place] [address].    Thanks,  Michell

## 2024-09-16 NOTE — TELEPHONE ENCOUNTER
Requested Prescriptions     Pending Prescriptions Disp Refills    ENSKYCE 0.15-30 MG-MCG Oral Tab [Pharmacy Med Name: Enskyce 0.15-30 Mg-Mcg Tab Lupi] 84 tablet 0     Sig: Take 1 tablet by mouth daily.        Last refill: 6/26/24 84 tabs 0 refills    Last Appointment: 1/30/24 Telemed    Next Appointment: No future OV's scheduled

## 2024-10-16 ENCOUNTER — TELEPHONE (OUTPATIENT)
Dept: FAMILY MEDICINE CLINIC | Facility: CLINIC | Age: 19
End: 2024-10-16

## 2024-10-16 ENCOUNTER — OFFICE VISIT (OUTPATIENT)
Dept: FAMILY MEDICINE CLINIC | Facility: CLINIC | Age: 19
End: 2024-10-16
Payer: COMMERCIAL

## 2024-10-16 VITALS
OXYGEN SATURATION: 99 % | WEIGHT: 170 LBS | SYSTOLIC BLOOD PRESSURE: 140 MMHG | DIASTOLIC BLOOD PRESSURE: 80 MMHG | RESPIRATION RATE: 18 BRPM | HEART RATE: 107 BPM | HEIGHT: 65 IN | TEMPERATURE: 98 F | BODY MASS INDEX: 28.32 KG/M2

## 2024-10-16 DIAGNOSIS — J02.9 SORE THROAT: ICD-10-CM

## 2024-10-16 DIAGNOSIS — J03.90 TONSILLITIS WITH EXUDATE: Primary | ICD-10-CM

## 2024-10-16 LAB
CONTROL LINE PRESENT WITH A CLEAR BACKGROUND (YES/NO): YES YES/NO
KIT LOT #: NORMAL NUMERIC

## 2024-10-16 PROCEDURE — 3077F SYST BP >= 140 MM HG: CPT | Performed by: NURSE PRACTITIONER

## 2024-10-16 PROCEDURE — 87081 CULTURE SCREEN ONLY: CPT | Performed by: NURSE PRACTITIONER

## 2024-10-16 PROCEDURE — 87880 STREP A ASSAY W/OPTIC: CPT | Performed by: NURSE PRACTITIONER

## 2024-10-16 PROCEDURE — 99203 OFFICE O/P NEW LOW 30 MIN: CPT | Performed by: NURSE PRACTITIONER

## 2024-10-16 PROCEDURE — 3008F BODY MASS INDEX DOCD: CPT | Performed by: NURSE PRACTITIONER

## 2024-10-16 PROCEDURE — 3079F DIAST BP 80-89 MM HG: CPT | Performed by: NURSE PRACTITIONER

## 2024-10-16 NOTE — PROGRESS NOTES
CHIEF COMPLAINT:     Chief Complaint   Patient presents with    Sore Throat     X 2 days, fever, tmax 101.6, ear pain, white patches, OTC ibuprofen and theraflu          HPI:   Michell Romero is a 19 year old female presents to clinic with complaint of sore throat. Patient has had for 2 days. Patient reports following associated symptoms: nasal congestion, cough, fever yesterday 101.6F, ear pain. Fever resolved today. White patched on tonsils. Has history of strep throat. No one is sick at home.  no known strep or mono exposure.   Treating symptoms with ibuprofen and theraflu.    Current Outpatient Medications   Medication Sig Dispense Refill    ENSKYCE 0.15-30 MG-MCG Oral Tab Take 1 tablet by mouth daily. 84 tablet 0    clobetasol 0.05 % External Solution  (Patient not taking: Reported on 7/2/2024)      Meloxicam 15 MG Oral Tab Take 1 tablet (15 mg total) by mouth daily. (Patient not taking: Reported on 7/2/2024) 15 tablet 1    HYDROcodone-acetaminophen 5-325 MG Oral Tab Take 1 tablet by mouth every 12 (twelve) hours as needed for Pain. (Patient not taking: Reported on 7/2/2024) 6 tablet 0    Fluocinonide 0.05 % External Solution Apply to affected area(s) on scalp every night at bedtime for 2 weeks when flared as needed (Patient not taking: Reported on 7/2/2024)      ketoconazole 2 % External Shampoo Massage into scalp 2 to 3 times weekly; let sit for 3 to 5 minutes, then rinse (Patient not taking: Reported on 7/2/2024)      hydrOXYzine 10 MG Oral Tab Take 1-3 tablets (10-30 mg total) by mouth 3 (three) times daily as needed for Anxiety. 30 tablet 0    cyclobenzaprine 5 MG Oral Tab Take 1 tablet (5 mg total) by mouth nightly as needed for Muscle spasms (TMJ). (Patient not taking: Reported on 5/24/2024) 20 tablet 0    busPIRone 10 MG Oral Tab Take 1 tablet (10 mg total) by mouth every morning. (Patient not taking: Reported on 1/30/2024) 90 tablet 1    Multiple Vitamins-Minerals (MULTIVITAMIN GUMMIES WOMENS) Oral  Chew Tab Chew 3 tablets by mouth daily.      aspirin-acetaminophen-caffeine 250-250-65 MG Oral Tab Take 1 tablet by mouth every 6 (six) hours as needed for Pain. (Patient not taking: Reported on 7/2/2024)      lactase 9000 units Oral Chew Tab Chew 1 tablet (9,000 Units total) by mouth 3 (three) times daily as needed.      ibuprofen (MOTRIN) 200 MG Oral Tab Take 2 tablets (400 mg total) by mouth every 6 (six) hours as needed for Pain (menstrual pain). (Patient not taking: Reported on 6/4/2024)        Past Medical History:    Allergic rhinitis    Anxiety    Closed fracture of middle phalanx of ring finger      Social History:  Social History     Socioeconomic History    Marital status: Single   Tobacco Use    Smoking status: Never    Smokeless tobacco: Never   Vaping Use    Vaping status: Never Used   Substance and Sexual Activity    Alcohol use: No    Drug use: No   Other Topics Concern     Service No    Blood Transfusions No    Caffeine Concern Yes     Comment: 2 coffees daily and  1 can of pop & 1 cup of tea weekly    Occupational Exposure No    Hobby Hazards No    Sleep Concern No    Stress Concern Yes    Weight Concern No    Special Diet No    Back Care No    Exercise Yes     Comment: 6 times weekly    Bike Helmet No    Seat Belt Yes    Self-Exams No        REVIEW OF SYSTEMS:   GENERAL HEALTH: feels well otherwise, good appetite  SKIN: denies any unusual skin lesions or rashes  HEENT: denies ear pain, See HPI  RESPIRATORY: denies shortness of breath or wheezing  CARDIOVASCULAR: denies chest pain or palpitations   GI: denies vomiting or diarrhea  NEURO: denies dizziness or lightheadedness    EXAM:   /80   Pulse 107   Temp 98.1 °F (36.7 °C)   Resp 18   Ht 5' 5\" (1.651 m)   Wt 170 lb (77.1 kg)   LMP 09/19/2024 (Approximate)   SpO2 99%   BMI 28.29 kg/m²   GENERAL: well developed, well nourished,in no apparent distress  SKIN: no rashes,no suspicious lesions  HEAD: atraumatic, normocephalic  EYES:  conjunctiva clear, EOM intact  EARS: TM's clear, non-injected, no bulging, retraction, or fluid bilaterally  NOSE: nostrils patent, no exudates, nasal mucosa pink and noninflamed  THROAT: oral mucosa pink, moist. Posterior pharynx mild erythematous and injected. +white exudates. Tonsils 2/4. No trismus, hoarseness, muffled voice, stridor, or uvular deviation.    NECK: supple, non-tender  LUNGS: clear to auscultation bilaterally; no wheezes, rales, or rhonchi. Breathing is non labored.  CARDIO: RRR without murmur  EXTREMITIES: no cyanosis, clubbing or edema  LYMPH: bilateral anterior cervical lymphadenopathy. No  posterior cervical or occipital lymphadenopathy.    Recent Results (from the past 24 hours)   Strep A Assay W/Optic    Collection Time: 10/16/24  3:51 PM   Result Value Ref Range    Strep Grp A Screen neg Negative    Control Line Present with a clear background (yes/no) yes Yes/No    Kit Lot # [id number] Numeric    Kit Expiration Date 5/21/25 Date         ASSESSMENT AND PLAN:   Assessment: 1.   Encounter Diagnoses   Name Primary?    Sore throat     Tonsillitis with exudate Yes     Rapid strep screen is negative   1. Sore throat  - Strep A Assay W/Optic  - Grp A Strep Cult, Throat; Future  - Grp A Strep Cult, Throat    2. Tonsillitis with exudate      Plan: Discussed that due to symptoms and negative rapid strep this is most likely viral and does not require antibiotics.   send throat culture.  Comfort measures explained and discussed as listed in Patient Instructions  Follow up with PCP in 3-5 days if not improving, condition worsens, or fever greater than or equal to 100.4 persists for 72 hours.      See pt handout    The patient/parent indicates understanding of these issues and agrees to the plan.

## 2024-10-16 NOTE — TELEPHONE ENCOUNTER
Michell Woodmilvias  LOV: 1/30/2024       Calling for new New or Existing condition (choose one)  Patient experiencing: Possible strep/fever 101 (list symptoms/concerns)  Duration/Onset of symptom: 2days    No appointments available with Jennifer Crowell PA-C .     Patient referred to:         [x] [facility]     [facility] [address], [place] [address]            Phone: [phone]         [x] [facility]- [address] , [place] [address]                Phone: [phone]       [] [facility]- [address] [place] [address]              Phone: [phone]    Routing to nurse for verification/reference.

## 2024-12-02 DIAGNOSIS — N94.6 DYSMENORRHEA: ICD-10-CM

## 2024-12-02 RX ORDER — DESOGESTREL AND ETHINYL ESTRADIOL 0.15-0.03
1 KIT ORAL DAILY
Qty: 28 TABLET | Refills: 0 | Status: SHIPPED | OUTPATIENT
Start: 2024-12-02 | End: 2025-01-06

## 2025-01-06 ENCOUNTER — OFFICE VISIT (OUTPATIENT)
Dept: FAMILY MEDICINE CLINIC | Facility: CLINIC | Age: 20
End: 2025-01-06
Payer: COMMERCIAL

## 2025-01-06 VITALS
HEIGHT: 65 IN | SYSTOLIC BLOOD PRESSURE: 130 MMHG | BODY MASS INDEX: 29.62 KG/M2 | TEMPERATURE: 98 F | OXYGEN SATURATION: 98 % | WEIGHT: 177.81 LBS | RESPIRATION RATE: 16 BRPM | DIASTOLIC BLOOD PRESSURE: 80 MMHG | HEART RATE: 88 BPM

## 2025-01-06 DIAGNOSIS — Z13.29 SCREENING FOR ENDOCRINE, NUTRITIONAL, METABOLIC AND IMMUNITY DISORDER: ICD-10-CM

## 2025-01-06 DIAGNOSIS — A09 DIARRHEA OF INFECTIOUS ORIGIN: ICD-10-CM

## 2025-01-06 DIAGNOSIS — Z13.220 ENCOUNTER FOR LIPID SCREENING FOR CARDIOVASCULAR DISEASE: ICD-10-CM

## 2025-01-06 DIAGNOSIS — Z13.0 SCREENING FOR ENDOCRINE, NUTRITIONAL, METABOLIC AND IMMUNITY DISORDER: ICD-10-CM

## 2025-01-06 DIAGNOSIS — Z13.228 SCREENING FOR ENDOCRINE, NUTRITIONAL, METABOLIC AND IMMUNITY DISORDER: ICD-10-CM

## 2025-01-06 DIAGNOSIS — Z13.21 SCREENING FOR ENDOCRINE, NUTRITIONAL, METABOLIC AND IMMUNITY DISORDER: ICD-10-CM

## 2025-01-06 DIAGNOSIS — Z13.6 ENCOUNTER FOR LIPID SCREENING FOR CARDIOVASCULAR DISEASE: ICD-10-CM

## 2025-01-06 DIAGNOSIS — F41.0 EPISODIC PAROXYSMAL ANXIETY DISORDER: ICD-10-CM

## 2025-01-06 DIAGNOSIS — E66.3 OVERWEIGHT (BMI 25.0-29.9): ICD-10-CM

## 2025-01-06 DIAGNOSIS — N94.6 DYSMENORRHEA: ICD-10-CM

## 2025-01-06 DIAGNOSIS — Z00.00 WELL FEMALE EXAM WITHOUT GYNECOLOGICAL EXAM: Primary | ICD-10-CM

## 2025-01-06 PROCEDURE — 3075F SYST BP GE 130 - 139MM HG: CPT | Performed by: FAMILY MEDICINE

## 2025-01-06 PROCEDURE — 3079F DIAST BP 80-89 MM HG: CPT | Performed by: FAMILY MEDICINE

## 2025-01-06 PROCEDURE — 99395 PREV VISIT EST AGE 18-39: CPT | Performed by: FAMILY MEDICINE

## 2025-01-06 PROCEDURE — 99213 OFFICE O/P EST LOW 20 MIN: CPT | Performed by: FAMILY MEDICINE

## 2025-01-06 PROCEDURE — 3008F BODY MASS INDEX DOCD: CPT | Performed by: FAMILY MEDICINE

## 2025-01-06 RX ORDER — DESOGESTREL AND ETHINYL ESTRADIOL 0.15-0.03
1 KIT ORAL DAILY
Qty: 3 EACH | Refills: 3 | Status: SHIPPED | OUTPATIENT
Start: 2025-01-06

## 2025-01-06 RX ORDER — HYDROXYZINE HYDROCHLORIDE 10 MG/1
TABLET, FILM COATED ORAL EVERY 8 HOURS PRN
Qty: 30 TABLET | Refills: 0 | Status: SHIPPED | OUTPATIENT
Start: 2025-01-06

## 2025-01-06 NOTE — PROGRESS NOTES
HPI:   Michell Romero is a 19 year old female who presents for a complete physical exam has complaints of diarrhea.       Elevated BP elevated during office visit secondary to anxiety  HR 70 at home   BP not checked at home  10/16/2024 blood pressure 140/80, 7/2/2024 143/84 today blood pressure after completing visit 130/80  Denies any chest pain, SOB, dizziness or fainting.  No swelling in the hands or feet.  No symptoms of PAD.    Diarrhea   Symptoms started on Thursday morning;  Wednesday ate red meat which she normally avoids.   Vomiting 2 times on Thursday morning then developed watery stools every 2 hours  Using Pepto-Bismol and without Pepto-Bismol BMs were hourly mostly watery some loose  No foreign travel, no antibiotics for the past year, no family members or friends with symptoms presently no nausea or vomiting gets some cramping/gurgling in the abdomen but no abdominal pain  Is able to eat now pain is not increased with food has had some reflux   Denies any fevers, chills or bodyaches no frequency, urgency or burning no blood or mucus in stools  Urinating normal is keeping well-hydrated    DIEGO  Patient states that her anxiety is overall doing much better has been exercising 5 to 6 days a week which  helps and eating healthy.    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    Has a good support system.  Does not smoke, drink  or do drugs.       Preventative care  Immunizations up-to-date COVID vaccine x3 Tdap 8/5/2014; HPV vaccine x3, influenza 10/1/2024  Dental exams UTD  Eye exams UTD  PHQ9  0  Sleep Apnea  none  Exercise  4 times a week cycling and weights  DIet healthy diet   FH PGF CAD AMI; no cancer        GYN  Mammogram never does self breast exam no issues no family history breast cancer  Birth control pills tolerating well no side effects menstrual cycles monthly  Birth control pills for dysmenorrhea and irregular menstrual cycles  No personal or family history of PE, DVT or hypercoagulability known.  Patient is a non-smoker.  No family history of breast cancer ovarian cancer.  Never sexually active defers Pap smear  Current Grade Level: Wilbur  year   Never sexually active     FH no cancer, grandfather acute MI  Social history going to college working on her masters never smoker, no alcohol, no drugs, not dating, exercise regular    Wt Readings from Last 6 Encounters:   01/06/25 177 lb 12.8 oz (80.6 kg)   10/16/24 170 lb (77.1 kg)   07/02/24 170 lb (77.1 kg)   06/04/24 170 lb (77.1 kg)   05/24/24 170 lb (77.1 kg)   10/09/23 176 lb (79.8 kg)     Body mass index is 29.59 kg/m².       Results for orders placed or performed in visit on 10/16/24   Strep A Assay W/Optic    Collection Time: 10/16/24  3:51 PM   Result Value Ref Range    Strep Grp A Screen neg Negative    Control Line Present with a clear background (yes/no) yes Yes/No    Kit Lot # [id number] Numeric    Kit Expiration Date 5/21/25 Date   Grp A Strep Cult, Throat    Collection Time: 10/16/24  3:57 PM    Specimen: Throat; Other   Result Value Ref Range    Strep Culture No Beta Hemolytic Strep Isolated         Current Outpatient Medications   Medication Sig Dispense Refill    hydrOXYzine 10 MG Oral Tab Take 0.5-2 tablets (5-20 mg total) by mouth every 8 (eight) hours as needed for Anxiety. 30 tablet 0    Desogestrel-Ethinyl Estradiol (ENSKYCE) 0.15-30 MG-MCG Oral Tab Take 1  tablet by mouth daily. 3 each 3    clobetasol 0.05 % External Solution 3 (three) times daily as needed. Patient is taking as needed      Fluocinonide 0.05 % External Solution daily as needed. Patient is taking differently      ketoconazole 2 % External Shampoo       Multiple Vitamins-Minerals (MULTIVITAMIN GUMMIES WOMENS) Oral Chew Tab Chew 3 tablets by mouth daily.      lactase 9000 units Oral Chew Tab Chew 1 tablet (9,000 Units total) by mouth 3 (three) times daily as needed.      ibuprofen (MOTRIN) 200 MG Oral Tab Take 2 tablets (400 mg total) by mouth every 6 (six) hours as needed for Pain (menstrual pain).        Past Medical History:    Allergic rhinitis    Anxiety    Closed fracture of middle phalanx of ring finger      History reviewed. No pertinent surgical history.   Family History   Problem Relation Age of Onset    Psoriasis Mother     Hypertension Father     Other (Primary biliary cirrhosis) Maternal Grandmother     Dementia Paternal Grandmother     Heart Disease Paternal Grandfather     Cancer Neg     Stroke Neg       Social History:   Social History     Socioeconomic History    Marital status: Single   Tobacco Use    Smoking status: Never     Passive exposure: Never    Smokeless tobacco: Never   Vaping Use    Vaping status: Never Used   Substance and Sexual Activity    Alcohol use: No    Drug use: No    Sexual activity: Yes     Partners: Female   Other Topics Concern     Service No    Blood Transfusions No    Caffeine Concern Yes     Comment: 2 coffees daily and  1 can of pop & 1 cup of tea weekly    Occupational Exposure No    Hobby Hazards No    Sleep Concern No    Stress Concern Yes    Weight Concern No    Special Diet No    Back Care No    Exercise Yes     Comment: 6 times weekly    Bike Helmet No    Seat Belt Yes    Self-Exams No     Social Drivers of Health     Financial Resource Strain: Low Risk  (1/6/2025)    Financial Resource Strain     Difficulty of Paying Living Expenses: Not hard  at all     Med Affordability: No   Food Insecurity: No Food Insecurity (1/6/2025)    Food Insecurity     Food Insecurity: Never true   Transportation Needs: No Transportation Needs (1/6/2025)    Transportation Needs     Lack of Transportation: No   Physical Activity: Sufficiently Active (1/6/2025)    Exercise Vital Sign     Days of Exercise per Week: 4 days     Minutes of Exercise per Session: 60 min   Social Connections: Socially Integrated (1/6/2025)    Social Connections     Frequency of Socialization with Friends and Family: 3     Occ: Student.  College  Exercise:  4 times per week.  Diet: watches fats closely and watches sugar closely     REVIEW OF SYSTEMS:   GENERAL: denies fevers, weakness, trouble sleeping or weight changes  SKIN: denies any unusual skin lesions or rashes  EYES:denies vision changes  HEENT: denies upper respiratory symptoms  LUNGS: denies cough or shortness of breath with exertion  CHEST:  denies breast changes or pain  CARDIOVASCULAR: denies chest pain or tightness on exertion: no edema  VASCULAR: denies leg cramps  GI: denies abdominal pain, bowel movement changes, blood in stool  : denies urinary problems, vaginal discharge or discomfort,  periods regular   MUSCULOSKELETAL: denies joint pain or stiffness  NEURO: denies headaches, tingling or dizziness  PSYCHE: denies depression or anxiety  HEMATOLOGIC: denies bleeding abnormalities  ENDOCRINE: denies temperature intolerance, polyuria, or excessive sweating.  LYMPHATICS: denies swollen glands      EXAM:   /80   Pulse 88   Temp 98.1 °F (36.7 °C) (Temporal)   Resp 16   Ht 5' 5\" (1.651 m)   Wt 177 lb 12.8 oz (80.6 kg)   LMP 12/12/2024 (Approximate)   SpO2 98%   BMI 29.59 kg/m²   Body mass index is 29.59 kg/m².   GENERAL: well developed, well nourished and in no apparent distress  SKIN: no rashes,no suspicious lesions  HEENT: atraumatic, normocephalic,ears, nose and throat are normal  EYES: PERRLA, EOMI, sclera, conjunctiva are  clear  NECK: supple,no adenopathy,no carotid bruits  CHEST: no chest tenderness  BREAST: symmetrical, no suspicious mass, no nipple dimpling or discharge.  LUNGS: clear to auscultation bilateral, no rales, rhonchi or wheezing  CARDIO: RRR without murmur normal S1S2  ABD: Hyperactive bowel sounds soft, non tender, no masses, HSM or tenderness  : Deferred never sexually active   MUSCULOSKELETAL: gait laurent,l no gross M/S defect.  EXTREMITIES: no clubbing, cyanosis, or edema  NEURO: oriented times three, cranial nerves are grossly intact, no gross motor or sensory deficit.    ASSESSMENT AND PLAN:   Michell Romero is a 19 year old female who presents for a complete physical exam.    Encounter Diagnoses   Name Primary?    Well female exam without gynecological exam Yes    Diarrhea of infectious origin     Episodic paroxysmal anxiety disorder     Dysmenorrhea     Encounter for screening for cervical cancer     Screening examination for STI     Screening for chlamydial disease     Screening for gonorrhea     Routine screening for STI (sexually transmitted infection)     Screening for endocrine, nutritional, metabolic and immunity disorder     Encounter for lipid screening for cardiovascular disease        Orders Placed This Encounter   Procedures    CBC With Differential With Platelet    Comp Metabolic Panel (14)    Lipid Panel    TSH and Free T4    Vitamin B12 [E]    C. diff toxigenic PCR (OPT)    Stool Culture w/Shigatoxin [E]       Meds & Refills for this Visit:  Requested Prescriptions     Signed Prescriptions Disp Refills    hydrOXYzine 10 MG Oral Tab 30 tablet 0     Sig: Take 0.5-2 tablets (5-20 mg total) by mouth every 8 (eight) hours as needed for Anxiety.    Desogestrel-Ethinyl Estradiol (ENSKYCE) 0.15-30 MG-MCG Oral Tab 3 each 3     Sig: Take 1 tablet by mouth daily.       Imaging & Consults:  None    1. Well female exam without gynecological exam  Overweight  Pap and pelvic deferred never sexually active.     Self breast exam explained. Health maintenance guidance given including vision and dental exams, vitamin D 1,000 iu daily with calcium 500 mg daily.  Lifestyle guidance provided recommended low fat diet and aerobic exercise 30 minutes 3-4 times weekly.      Use, risks and benefits of hormonal contraception discussed including blood clot that can go to the lungs, heart or brain and cause Heart Attack, Stroke and even Death. These risks are further increased with smoking. Do not start smoking. Patient verbalizes understanding.    - Desogestrel-Ethinyl Estradiol (ENSKYCE) 0.15-30 MG-MCG Oral Tab; Take 1 tablet by mouth daily.  Dispense: 3 each; Refill: 3    Weight loss discussed patient should start exercising daily for 30-40 minutes also reducing all carbohydrates both simple and complex.  Can eat fruits and vegetables and small frequent meals of healthy combinations of protein and carbohydrate.  Avoiding packaged/processed.     2. Diarrhea of infectious origin  Start probiotic, Imodium A-D as needed after getting C. difficile and stool cultures done  Maintain hydration with adequate electrolytes  - C. diff toxigenic PCR (OPT); Future  - Stool Culture w/Shigatoxin [E]; Future    3. Episodic paroxysmal anxiety disorder  Reviewed medication benefits and side effects.   Continue with exercise, healthy diet and adequate sleep  - hydrOXYzine 10 MG Oral Tab; Take 0.5-2 tablets (5-20 mg total) by mouth every 8 (eight) hours as needed for Anxiety.  Dispense: 30 tablet; Refill: 0    4. Dysmenorrhea  - Desogestrel-Ethinyl Estradiol (ENSKYCE) 0.15-30 MG-MCG Oral Tab; Take 1 tablet by mouth daily.  Dispense: 3 each; Refill: 3    5.  Screening for endocrine, nutritional, metabolic and immunity disorder  - CBC With Differential With Platelet; Future  - Comp Metabolic Panel (14); Future  - TSH and Free T4; Future  - Vitamin B12 [E]; Future    6. Encounter for lipid screening for cardiovascular disease  - Lipid Panel; Future  The  patient indicates understanding of these issues and agrees to the plan.  The patient is asked to return for complete physical yearly or as needed.

## 2025-01-06 NOTE — PATIENT INSTRUCTIONS
Supplement suggestions for energy/anxiety/insomnia.  Methyl folate 400 mcg and methylcobalamin 1000 mcg.  Vitamin D3 @ 5000 international units, magnesium glycinate 400 mg and ashwagandha.        GERD prevention reviewed avoid caffeine, alcohol, and nonstnoeroidals.  Eat small frequent meals and avoid eating 3-4 hours before bedtime, try to raise the head of bed.      Probiotic OTC and imodium and Pepcig for GERD

## 2025-01-07 ENCOUNTER — LAB ENCOUNTER (OUTPATIENT)
Dept: LAB | Age: 20
End: 2025-01-07
Attending: FAMILY MEDICINE
Payer: COMMERCIAL

## 2025-01-07 DIAGNOSIS — Z13.0 SCREENING FOR ENDOCRINE, NUTRITIONAL, METABOLIC AND IMMUNITY DISORDER: ICD-10-CM

## 2025-01-07 DIAGNOSIS — Z13.21 SCREENING FOR ENDOCRINE, NUTRITIONAL, METABOLIC AND IMMUNITY DISORDER: ICD-10-CM

## 2025-01-07 DIAGNOSIS — Z13.6 ENCOUNTER FOR LIPID SCREENING FOR CARDIOVASCULAR DISEASE: ICD-10-CM

## 2025-01-07 DIAGNOSIS — Z13.29 SCREENING FOR ENDOCRINE, NUTRITIONAL, METABOLIC AND IMMUNITY DISORDER: ICD-10-CM

## 2025-01-07 DIAGNOSIS — Z13.220 ENCOUNTER FOR LIPID SCREENING FOR CARDIOVASCULAR DISEASE: ICD-10-CM

## 2025-01-07 DIAGNOSIS — Z13.228 SCREENING FOR ENDOCRINE, NUTRITIONAL, METABOLIC AND IMMUNITY DISORDER: ICD-10-CM

## 2025-01-07 DIAGNOSIS — A09 DIARRHEA OF INFECTIOUS ORIGIN: ICD-10-CM

## 2025-01-07 PROBLEM — IMO0002 BMI (BODY MASS INDEX), PEDIATRIC, 95-99% FOR AGE: Status: RESOLVED | Noted: 2018-10-08 | Resolved: 2025-01-07

## 2025-01-07 PROBLEM — E66.3 OVERWEIGHT (BMI 25.0-29.9): Status: ACTIVE | Noted: 2025-01-07

## 2025-01-07 LAB
ALBUMIN SERPL-MCNC: 4.7 G/DL (ref 3.2–4.8)
ALBUMIN/GLOB SERPL: 1.6 {RATIO} (ref 1–2)
ALP LIVER SERPL-CCNC: 49 U/L
ALT SERPL-CCNC: 34 U/L
ANION GAP SERPL CALC-SCNC: 8 MMOL/L (ref 0–18)
AST SERPL-CCNC: 26 U/L (ref ?–34)
BASOPHILS # BLD AUTO: 0.01 X10(3) UL (ref 0–0.2)
BASOPHILS NFR BLD AUTO: 0.2 %
BILIRUB SERPL-MCNC: 0.3 MG/DL (ref 0.3–1.2)
BUN BLD-MCNC: 7 MG/DL (ref 9–23)
CALCIUM BLD-MCNC: 9.9 MG/DL (ref 8.7–10.4)
CHLORIDE SERPL-SCNC: 107 MMOL/L (ref 98–112)
CHOLEST SERPL-MCNC: 151 MG/DL (ref ?–200)
CO2 SERPL-SCNC: 25 MMOL/L (ref 21–32)
CREAT BLD-MCNC: 0.68 MG/DL
EGFRCR SERPLBLD CKD-EPI 2021: 127 ML/MIN/1.73M2 (ref 60–?)
EOSINOPHIL # BLD AUTO: 0.05 X10(3) UL (ref 0–0.7)
EOSINOPHIL NFR BLD AUTO: 1.1 %
ERYTHROCYTE [DISTWIDTH] IN BLOOD BY AUTOMATED COUNT: 11.8 %
FASTING PATIENT LIPID ANSWER: YES
FASTING STATUS PATIENT QL REPORTED: YES
GLOBULIN PLAS-MCNC: 2.9 G/DL (ref 2–3.5)
GLUCOSE BLD-MCNC: 97 MG/DL (ref 70–99)
HCT VFR BLD AUTO: 38 %
HDLC SERPL-MCNC: 56 MG/DL (ref 40–59)
HGB BLD-MCNC: 13.5 G/DL
IMM GRANULOCYTES # BLD AUTO: 0.02 X10(3) UL (ref 0–1)
IMM GRANULOCYTES NFR BLD: 0.4 %
LDLC SERPL CALC-MCNC: 78 MG/DL (ref ?–100)
LYMPHOCYTES # BLD AUTO: 1.96 X10(3) UL (ref 1–4)
LYMPHOCYTES NFR BLD AUTO: 41.9 %
MCH RBC QN AUTO: 30 PG (ref 26–34)
MCHC RBC AUTO-ENTMCNC: 35.5 G/DL (ref 31–37)
MCV RBC AUTO: 84.4 FL
MONOCYTES # BLD AUTO: 0.29 X10(3) UL (ref 0.1–1)
MONOCYTES NFR BLD AUTO: 6.2 %
NEUTROPHILS # BLD AUTO: 2.35 X10 (3) UL (ref 1.5–7.7)
NEUTROPHILS # BLD AUTO: 2.35 X10(3) UL (ref 1.5–7.7)
NEUTROPHILS NFR BLD AUTO: 50.2 %
NONHDLC SERPL-MCNC: 95 MG/DL (ref ?–130)
OSMOLALITY SERPL CALC.SUM OF ELEC: 288 MOSM/KG (ref 275–295)
PLATELET # BLD AUTO: 312 10(3)UL (ref 150–450)
POTASSIUM SERPL-SCNC: 4.2 MMOL/L (ref 3.5–5.1)
PROT SERPL-MCNC: 7.6 G/DL (ref 5.7–8.2)
RBC # BLD AUTO: 4.5 X10(6)UL
SODIUM SERPL-SCNC: 140 MMOL/L (ref 136–145)
T4 FREE SERPL-MCNC: 1.6 NG/DL (ref 0.8–1.7)
TRIGL SERPL-MCNC: 94 MG/DL (ref 30–149)
TSI SER-ACNC: 0.76 UIU/ML (ref 0.55–4.78)
VIT B12 SERPL-MCNC: 428 PG/ML (ref 211–911)
VLDLC SERPL CALC-MCNC: 15 MG/DL (ref 0–30)
WBC # BLD AUTO: 4.7 X10(3) UL (ref 4–11)

## 2025-01-07 PROCEDURE — 80061 LIPID PANEL: CPT

## 2025-01-07 PROCEDURE — 87015 SPECIMEN INFECT AGNT CONCNTJ: CPT

## 2025-01-07 PROCEDURE — 82607 VITAMIN B-12: CPT

## 2025-01-07 PROCEDURE — 36415 COLL VENOUS BLD VENIPUNCTURE: CPT

## 2025-01-07 PROCEDURE — 84443 ASSAY THYROID STIM HORMONE: CPT

## 2025-01-07 PROCEDURE — 80053 COMPREHEN METABOLIC PANEL: CPT

## 2025-01-07 PROCEDURE — 85025 COMPLETE CBC W/AUTO DIFF WBC: CPT

## 2025-01-07 PROCEDURE — 87045 FECES CULTURE AEROBIC BACT: CPT

## 2025-01-07 PROCEDURE — 87077 CULTURE AEROBIC IDENTIFY: CPT

## 2025-01-07 PROCEDURE — 87427 SHIGA-LIKE TOXIN AG IA: CPT

## 2025-01-07 PROCEDURE — 87493 C DIFF AMPLIFIED PROBE: CPT

## 2025-01-07 PROCEDURE — 87046 STOOL CULTR AEROBIC BACT EA: CPT

## 2025-01-07 PROCEDURE — 84439 ASSAY OF FREE THYROXINE: CPT

## 2025-01-07 NOTE — PROGRESS NOTES
Normal white and red blood cell counts.  Normal kidney and liver function testing.  Normal blood sugar testing.  Normal lipid testing   Normal thyroid testing  Normal B12 level     sincerely,   Jennifer Crowell PA-C

## 2025-01-08 ENCOUNTER — TELEPHONE (OUTPATIENT)
Dept: FAMILY MEDICINE CLINIC | Facility: CLINIC | Age: 20
End: 2025-01-08

## 2025-01-08 LAB — C DIFF TOX B STL QL: POSITIVE

## 2025-01-08 RX ORDER — VANCOMYCIN HYDROCHLORIDE 125 MG/1
125 CAPSULE ORAL 4 TIMES DAILY
Qty: 40 CAPSULE | Refills: 0 | Status: SHIPPED | OUTPATIENT
Start: 2025-01-08 | End: 2025-01-18

## 2025-01-08 NOTE — TELEPHONE ENCOUNTER
Positive C dif patient notified on 1/8/2025 at 5 PM.  States her symptoms have greatly improved she is no longer having stomach cramping or watery stools.  Had 2 loose stools this morning and feeling back to normal.  Wants to monitor her symptoms will  the vancomycin 125 mg 4 times a day for 10 days but plans on starting it only if the symptoms return or she gets watery stools.  Explained importance of handwashing thoroughly and using a bleach for cleaning surfaces.  C. difficile contagion discussed with patient and mom

## 2025-01-08 NOTE — PROGRESS NOTES
Positive C dif patient notified on 1/8/2025 at 5 PM.  States her symptoms have greatly improved she is no longer having stomach cramping or watery stools.  Had 2 loose stools this morning and feeling back to normal.  Wants to monitor her symptoms will  the vancomycin 125 mg 4 times a day for 10 days but plans on starting it only if the symptoms return or she gets watery stools.  Explained importance of handwashing thoroughly and using a bleach for cleaning surfaces.  C. difficile contagion discussed with patient and mom.

## 2025-03-26 DIAGNOSIS — N94.6 DYSMENORRHEA: ICD-10-CM

## 2025-03-26 DIAGNOSIS — Z00.00 WELL FEMALE EXAM WITHOUT GYNECOLOGICAL EXAM: ICD-10-CM

## 2025-03-26 RX ORDER — DESOGESTREL AND ETHINYL ESTRADIOL 0.15-0.03
1 KIT ORAL DAILY
Qty: 3 EACH | Refills: 3 | OUTPATIENT
Start: 2025-03-26

## 2025-03-26 NOTE — TELEPHONE ENCOUNTER
Requested Prescriptions     Pending Prescriptions Disp Refills    Desogestrel-Ethinyl Estradiol (ENSKYCE) 0.15-30 MG-MCG Oral Tab 3 each 3     Sig: Take 1 tablet by mouth daily.       Last Refill: 1/6/25    Last OV: 1/6/25

## 2025-03-27 ENCOUNTER — PATIENT MESSAGE (OUTPATIENT)
Dept: FAMILY MEDICINE CLINIC | Facility: CLINIC | Age: 20
End: 2025-03-27

## 2025-04-08 PROBLEM — F41.1 GAD (GENERALIZED ANXIETY DISORDER): Status: ACTIVE | Noted: 2025-04-08

## 2025-04-08 PROBLEM — F41.0 PANIC ATTACK AS REACTION TO STRESS: Status: ACTIVE | Noted: 2025-04-08

## 2025-04-08 PROBLEM — F43.0 PANIC ATTACK AS REACTION TO STRESS: Status: ACTIVE | Noted: 2025-04-08

## 2025-05-02 ENCOUNTER — PATIENT MESSAGE (OUTPATIENT)
Dept: FAMILY MEDICINE CLINIC | Facility: CLINIC | Age: 20
End: 2025-05-02

## 2025-05-02 DIAGNOSIS — F43.0 PANIC ATTACK AS REACTION TO STRESS: ICD-10-CM

## 2025-05-02 DIAGNOSIS — F41.0 PANIC ATTACK AS REACTION TO STRESS: ICD-10-CM

## 2025-05-05 RX ORDER — ALPRAZOLAM 0.25 MG
0.25 TABLET ORAL 2 TIMES DAILY PRN
Qty: 10 TABLET | Refills: 0 | Status: SHIPPED | OUTPATIENT
Start: 2025-05-05

## 2025-05-23 ENCOUNTER — TELEPHONE (OUTPATIENT)
Age: 20
End: 2025-05-23

## 2025-05-23 NOTE — TELEPHONE ENCOUNTER
Hello - I am reaching out from the [facility] Navigation department, following up on an order from your provider's office to assist in connecting you with resources for care. If you would like to discuss this further, please give us a call at [phone], or for more immediate assistance you can contact our 24-hour help line at [phone]. We look forward to hearing from you soon.

## 2025-06-14 ENCOUNTER — TELEPHONE (OUTPATIENT)
Age: 20
End: 2025-06-14

## 2025-06-14 NOTE — TELEPHONE ENCOUNTER
Hello,  Sorry I missed you - I am reaching out from the [facility] Navigation department, following up on an order from your provider's office to assist in connecting you with resources for care. If you would like to discuss this further, please give us a call back at [phone], or for more immediate assistance you can contact our 24-hour help line at [phone] We look forward to hearing from you soon.

## 2025-06-27 ENCOUNTER — TELEPHONE (OUTPATIENT)
Age: 20
End: 2025-06-27

## 2025-06-27 NOTE — TELEPHONE ENCOUNTER
Sea Galarza,  I am glad that we were able to connect today. Here are some therapy resources that may be a good fit for you. Based on their online profiles, they are in network with your current insurance, [facility] H[employer]. Please verify your insurance coverage with any providers that you may choose to call and schedule with directly. If you have any other questions, please give me a call at [phone]. If you need more immediate assistance, or assistance outside of business hours, please contact the [facility] 24/7 helpline at [phone] ([phone]).  [facility]- Noy Pandey, [facility]  Ronel Dhaliwal, [facility]  Abbey Spivey, [facility]  Sujey Bruno, Chelsea Esquivel, [facility]  [address], [address]  [place] [address]  [phone]    [facility]-Diane Marshall, [facility]  Bonilla Wagner, [facility]  Shannan Barlow, [facility]  [address], [address]  [place], [place] [address]  [phone]    [facility]  [address]  [place] [address]  [phone]    [facility]-Ronel Gutiérrez, [facility]  Slime Wheeler, [facility]  [address], [address]  [place] [address]  [phone]    Noy Manzanares  Behavioral Health Navigator   [facility]   24/7 Crisis Line [phone] ([phone])

## 2025-07-09 ENCOUNTER — TELEPHONE (OUTPATIENT)
Age: 20
End: 2025-07-09

## 2025-07-12 PROBLEM — F32.81 PMDD (PREMENSTRUAL DYSPHORIC DISORDER): Status: ACTIVE | Noted: 2025-07-12

## 2025-07-12 NOTE — PROGRESS NOTES
Michell Romero is a 20 year old female.  HPI:   Please note that the following visit was completed using two-way, real-time interactive audio and video communication.  This has been done in good denia to provide continuity of care in the best interest of the provider-patient relationship,  There are limitations of this visit as no physical exam could be performed.  Every conscious effort was taken to allow for sufficient and adequate time.  This billing was spent on reviewing labs, medications, radiology tests and decision making.  Appropriate medical decision-making and tests are ordered as detailed in the plan of care above.      Audiovideo call with patient  to discuss follow up on RX       The following individual(s) verbally consented to be recorded using ambient AI listening technology and understand that they can each withdraw their consent to this listening technology at any point by asking the clinician to turn off or pause the recording:    Patient name: Michell Romero  Additional names:  none    History of Present Illness  Michell Romero is a 20 year old female who presents with anxiety and depressive symptoms related to her menstrual cycle.    She has been experiencing anxiety and depressive symptoms associated with her menstrual cycle. Increasing her fluoxetine dosage to 30 mg has alleviated some anxiety, but she  experienced significant depressive symptoms and intrusive thoughts two days before her last menstrual period. Blaming it on PMDD,  These thoughts were described as 'really overwhelming' and 'not me', and were more intense than any previous experiences while on a 20 mg dose of fluoxetine.    She has never experienced such intrusive thoughts before and feels the PMDD is getting worse since the rest of the rime she is feeling really good on the 20 mg Fluoxetine.  The depressive episode lasted about a week, with the most intense symptoms occurring two days before her period. Since then, her  anxiety has been focused on the fear of these thoughts returning, which has been distressing for her.    The patient reports that she has not previously experienced actionable thoughts of self-harm or suicide, but during her last menstrual period, she did experience intrusive thoughts about death and self-harm that she found distressing and unusual for her. During the review of symptoms, she reports a score of zero for little interest or pleasure in doing things, feeling tired, poor appetite, feeling bad about herself, trouble concentrating, and thoughts of self-harm. She scores one for feeling down, trouble sleeping, and feeling nervous or anxious.    Her anxiety has improved overall, but she is concerned about the recurrence of intrusive thoughts. She is currently taking fluoxetine 30 mg daily and wants to continue taking.  She feels that with the placebos she just gets more emotional and feels that increasing the fluoxetine during that time might be beneficial.  Does not feel it was the fluoxetine that triggered his symptoms just worsening PMDD.  Main symptoms of anxiety and panic started in March while she was at [employer] at school has not graduated and will be starting a new job in [place] in the next month or 2.    1. Little interest or pleasure in doing things: Not at all  2. Feeling down, depressed, or hopeless: Several days  3. Trouble falling or staying asleep, or sleeping too much: Several days  4. Feeling tired or having little energy: Not at all  5. Poor appetite or overeating: Not at all  6. Feeling bad about yourself - or that you are a failure or have let yourself or your family down: Not at all  7. Trouble concentrating on things, such as reading the newspaper or watching television: Not at all  8. Moving or speaking so slowly that other people could have noticed. Or the opposite - being so fidgety or restless that you have been moving around a lot more than usual: Not at all  9. Thoughts that you  would be better off dead, or of hurting yourself in some way: Not at all  PHQ-9 TOTAL SCORE: 2  If you checked off any problems, how difficult have these problems made it for you to do your work, take care of things at home, or get along with other people?: Not difficult at all        7/12/2025   [facility] AMB GAIL-7    Feeling nervous, anxious, or on edge 1    Not being able to stop or control worrying 1    Worrying too much about different things    1    Trouble relaxing 0    Being so restless that it's hard to sit still 0    Becoming easily annoyed or irritable 0    Feeling afraid as if something awful might happen 0    GAIL 7 Total Score 3 - minimal anxiety          Current Medications[1]   Past Medical History[2]   Social History:  Short Social Hx on File[3]     REVIEW OF SYSTEMS:   GENERAL HEALTH: feels well otherwise  SKIN: denies any unusual skin lesions or rashes  RESPIRATORY: denies shortness of breath with exertion  CARDIOVASCULAR: denies chest pain on exertion  GI: denies abdominal pain and denies heartburn  NEURO: denies headaches  Musculoskeletal: No motor deficits  psych see above  EXAM:   No vitals taken due to tele-visit.  20 time was spent reviewing patient's inquiry, patient's record including prior labs, developing management plan and ordering tests and prescriptions.    Full exam was not done secondary to virtual visit.  Reviewed medications, problem list and allergies.  GENERAL: Patient is speaking in full sentences no increased work in breathing patient is alert and orientated x3.    Psych patient's mood is anxious  affect is normal is very pleasant and communication skills are good    ASSESSMENT AND PLAN:     Encounter Diagnoses   Name Primary?    PMDD (premenstrual dysphoric disorder) Yes    GAIL (generalized anxiety disorder)     Panic attack as reaction to stress     Medication management        No orders of the defined types were placed in this encounter.      Meds & Refills for this  Visit:  Requested Prescriptions      No prescriptions requested or ordered in this encounter       Imaging & Consults:  None    Assessment & Plan  Premenstrual Dysphoric Disorder (PMDD)  She experiences severe depressive symptoms and intrusive thoughts two days before her menstrual period, which are atypical for her.  Hormonal changes during the menstrual cycle may contribute to these symptoms due to natural serotonin level decreases. Although fluoxetine is typically prescribed for PMDD and should help balance serotonin levels, the possibility of fluoxetine contributing to these symptoms is considered though patient believes it is not the fluoxetine is more of a hormonal change with being off the birth control pill prior to the menstrual cycle starting.  She is requesting to increase fluoxetine to 40 mg right before her menstrual cycle.  Increasing fluoxetine dosage during the menstrual cycle is discussed as a management option, which has been effective for other patients with similar issues.  - Increase fluoxetine to 40 mg during the menstrual cycle for 5-7 days, starting 1-2 days before the onset of symptoms and continuing 2 days after the symptoms subside.  - Monitor symptoms and report if they worsen or if new symptoms arise.    Generalized Anxiety Disorder  She reports improvement in anxiety symptoms with the current fluoxetine dosage of 30 mg. The GAIL-7 score has decreased from 13 to 3, indicating significant improvement. She expresses concern about anxiety related to intrusive thoughts experienced during the menstrual cycle. These thoughts are not reflective of her true feelings and are likely related to hormonal changes.  - Continue fluoxetine 30 mg daily outside of the menstrual cycle.  - Monitor anxiety symptoms and adjust treatment as necessary.    Panic Disorder  Her anxiety symptoms have improved with the current treatment regimen. She did not report any recent panic attacks.  - Continue current  treatment regimen and monitor for any recurrence of panic attacks.    Recording duration: 15 minutes  Provider patient relationship has had a longitudinal long term relationship to address and treat complex conditions.  Time spent was 20 minutes more than 50% was spent on counseling regarding medical conditions and treatment.  Rest of time was spent reviewing chart, reviewing blood work and radiology tests.       The patient indicates understanding of these issues and agrees to the plan.  The patient is asked to return 3-4 months  or as needed.    This note was created by Synchronized voice recognition. Errors in content may be related to improper recognition by the system; efforts to review and correct have been done but errors may still exist.  Note to patient: The 21st Century Cures Act makes medical notes like these available to patients in the interest of transparency. However, be advised this is a medical document. It is intended as peer to peer communication. It is written in medical language and may contain abbreviations or verbiage that are unfamiliar. It may appear blunt or direct. Medical documents are intended to carry relevant information, facts as evident, and the clinical opinion of the practitioner.             [1]   Current Outpatient Medications   Medication Sig Dispense Refill    FLUoxetine 10 MG Oral Cap Take 1 capsule (10 mg total) by mouth daily. Take with 20 mg for a total of 30 mg daily 90 capsule 0    FLUoxetine 20 MG Oral Cap Take 1 capsule (20 mg total) by mouth daily. Take with 10 mg for a total of 30 mg 90 capsule 0    ALPRAZolam (XANAX) 0.25 MG Oral Tab Take 1 tablet (0.25 mg total) by mouth 2 (two) times daily as needed for Anxiety (panic attack). 10 tablet 0    hydrOXYzine 10 MG Oral Tab Take 0.5-2 tablets (5-20 mg total) by mouth every 8 (eight) hours as needed for Anxiety. 30 tablet 0    Desogestrel-Ethinyl Estradiol (ENSKYCE) 0.15-30 MG-MCG Oral Tab Take 1 tablet by mouth daily. 3 each 3     clobetasol 0.05 % External Solution 3 (three) times daily as needed. Patient is taking as needed      Fluocinonide 0.05 % External Solution daily as needed. Patient is taking differently      ketoconazole 2 % External Shampoo       Multiple Vitamins-Minerals (MULTIVITAMIN GUMMIES WOMENS) Oral Chew Tab Chew 3 tablets by mouth daily.      lactase 9000 units Oral Chew Tab Chew 1 tablet (9,000 Units total) by mouth 3 (three) times daily as needed.      ibuprofen (MOTRIN) 200 MG Oral Tab Take 2 tablets (400 mg total) by mouth every 6 (six) hours as needed for Pain (menstrual pain).     [2]   Past Medical History:   Allergic rhinitis    Anxiety    Closed fracture of middle phalanx of ring finger   [3]   Social History  Socioeconomic History    Marital status: Single   Tobacco Use    Smoking status: Never     Passive exposure: Never    Smokeless tobacco: Never   Vaping Use    Vaping status: Never Used   Substance and Sexual Activity    Alcohol use: No    Drug use: No    Sexual activity: Yes     Partners: Female   Other Topics Concern     Service No    Blood Transfusions No    Caffeine Concern Yes    Occupational Exposure No    Hobby Hazards No    Sleep Concern No    Stress Concern Yes    Weight Concern No    Special Diet No    Back Care No    Exercise Yes    Bike Helmet No    Seat Belt Yes    Self-Exams No     Social Drivers of Health     Food Insecurity: No Food Insecurity (5/19/2025)    NCSS - Food Insecurity     Worried About Running Out of Food in the Last Year: No     Ran Out of Food in the Last Year: No   Transportation Needs: No Transportation Needs (5/19/2025)    NCSS - Transportation     Lack of Transportation: No   Housing Stability: Not At Risk (5/19/2025)    NCSS - Housing/Utilities     Has Housing: Yes     Worried About Losing Housing: No     Unable to Get Utilities: No

## 2025-08-13 DIAGNOSIS — F41.0 PANIC ATTACK AS REACTION TO STRESS: ICD-10-CM

## 2025-08-13 DIAGNOSIS — F43.0 PANIC ATTACK AS REACTION TO STRESS: ICD-10-CM

## 2025-08-14 ENCOUNTER — PATIENT MESSAGE (OUTPATIENT)
Dept: FAMILY MEDICINE CLINIC | Facility: CLINIC | Age: 20
End: 2025-08-14

## 2025-08-14 RX ORDER — ALPRAZOLAM 0.25 MG
0.25 TABLET ORAL 2 TIMES DAILY PRN
Qty: 10 TABLET | Refills: 0 | Status: SHIPPED | OUTPATIENT
Start: 2025-08-14

## 2025-08-21 DIAGNOSIS — F41.1 GAD (GENERALIZED ANXIETY DISORDER): ICD-10-CM

## 2025-08-21 RX ORDER — FLUOXETINE 10 MG/1
10 CAPSULE ORAL DAILY
Qty: 90 CAPSULE | Refills: 0 | Status: SHIPPED | OUTPATIENT
Start: 2025-08-21

## (undated) NOTE — LETTER
Date: 12/6/2019    Patient Name: Bal Candelaria          To Whom it may concern: The above patient was seen at the [facility] for treatment of a medical condition.     This patient should be excused from attending the performance on Sunday

## (undated) NOTE — LETTER
Date: 10/16/2024    Patient Name: Michell Romero          To Whom it may concern:    This letter has been written at the patient's request. The above patient was seen at [facility] for treatment of a medical condition.    This patient should be excused from attending work/school from days missed.     The patient may return to work/school when fever free for 24hrs with the following limitations none.        Sincerely,    Olszewski,Kristen J, APRN

## (undated) NOTE — LETTER
12/18/20        Jose Alberto Elias  [address] [address]      Dear Kareem Tee,    [facility] records indicate that you have outstanding lab work and or testing that was ordered for you and has not yet been completed:  Orders Placed This Encounte

## (undated) NOTE — LETTER
11/07/18        [address] [id number]      Dear Elizabeth Nelson,    [facility] records indicate that you have outstanding lab work and or testing that was ordered for you and has not yet been completed:     cbc   lipid   TSH and Free T4

## (undated) NOTE — LETTER
Date: 5/16/2019    Patient Name: Blair Cruz          To Whom it may concern: This letter has been written at the patient's request. The above patient was seen at the [facility] for treatment of a medical condition.     This patient shou